# Patient Record
Sex: FEMALE | Race: WHITE | NOT HISPANIC OR LATINO | URBAN - NONMETROPOLITAN AREA
[De-identification: names, ages, dates, MRNs, and addresses within clinical notes are randomized per-mention and may not be internally consistent; named-entity substitution may affect disease eponyms.]

---

## 2017-02-03 PROBLEM — Z01.419 WELL WOMAN EXAM WITH ROUTINE GYNECOLOGICAL EXAM: Chronic | Status: ACTIVE | Noted: 2017-02-03

## 2024-08-09 ENCOUNTER — TELEPHONE (OUTPATIENT)
Dept: ONCOLOGY | Facility: CLINIC | Age: 44
End: 2024-08-09
Payer: COMMERCIAL

## 2024-08-09 NOTE — TELEPHONE ENCOUNTER
Caller: Ludy Lerma    Relationship: Self    Best call back number: [phone]     What is the best time to reach you: ASAP IF NEEDED    Who are you requesting to speak with (clinical staff, provider,  specific staff member):       What was the call regarding: PT MISSED CALL, NO MESSAGE, PLEASE CALL BACK IF NEEDED.

## 2024-08-21 ENCOUNTER — CONSULT (OUTPATIENT)
Dept: ONCOLOGY | Facility: CLINIC | Age: 44
End: 2024-08-21
Payer: COMMERCIAL

## 2024-08-21 ENCOUNTER — LAB (OUTPATIENT)
Dept: ONCOLOGY | Facility: CLINIC | Age: 44
End: 2024-08-21
Payer: COMMERCIAL

## 2024-08-21 VITALS
DIASTOLIC BLOOD PRESSURE: 76 MMHG | HEIGHT: 66 IN | WEIGHT: 198.8 LBS | TEMPERATURE: 97.1 F | HEART RATE: 74 BPM | BODY MASS INDEX: 31.95 KG/M2 | RESPIRATION RATE: 18 BRPM | SYSTOLIC BLOOD PRESSURE: 113 MMHG | OXYGEN SATURATION: 98 %

## 2024-08-21 DIAGNOSIS — D72.819 LEUKOPENIA, UNSPECIFIED TYPE: ICD-10-CM

## 2024-08-21 DIAGNOSIS — D72.819 LEUKOPENIA, UNSPECIFIED TYPE: Primary | ICD-10-CM

## 2024-08-21 LAB
ALBUMIN SERPL-MCNC: 4.1 G/DL (ref 3.5–5.2)
ALBUMIN/GLOB SERPL: 1.3 G/DL
ALP SERPL-CCNC: 65 U/L (ref 39–117)
ALT SERPL W P-5'-P-CCNC: 18 U/L (ref 1–33)
ANION GAP SERPL CALCULATED.3IONS-SCNC: 11.4 MMOL/L (ref 5–15)
AST SERPL-CCNC: 19 U/L (ref 1–32)
BASOPHILS # BLD AUTO: 0.05 10*3/MM3 (ref 0–0.2)
BASOPHILS NFR BLD AUTO: 1.5 % (ref 0–1.5)
BILIRUB SERPL-MCNC: 0.7 MG/DL (ref 0–1.2)
BUN SERPL-MCNC: 12 MG/DL (ref 6–20)
BUN/CREAT SERPL: 14.1 (ref 7–25)
CALCIUM SPEC-SCNC: 9.2 MG/DL (ref 8.6–10.5)
CHLORIDE SERPL-SCNC: 104 MMOL/L (ref 98–107)
CO2 SERPL-SCNC: 23.6 MMOL/L (ref 22–29)
CREAT SERPL-MCNC: 0.85 MG/DL (ref 0.57–1)
CRP SERPL-MCNC: <0.3 MG/DL (ref 0–0.5)
DEPRECATED RDW RBC AUTO: 43.5 FL (ref 37–54)
EGFRCR SERPLBLD CKD-EPI 2021: 87.3 ML/MIN/1.73
EOSINOPHIL # BLD AUTO: 0.06 10*3/MM3 (ref 0–0.4)
EOSINOPHIL NFR BLD AUTO: 1.8 % (ref 0.3–6.2)
ERYTHROCYTE [DISTWIDTH] IN BLOOD BY AUTOMATED COUNT: 11.9 % (ref 12.3–15.4)
ERYTHROCYTE [SEDIMENTATION RATE] IN BLOOD: 2 MM/HR (ref 0–20)
GLOBULIN UR ELPH-MCNC: 3.1 GM/DL
GLUCOSE SERPL-MCNC: 70 MG/DL (ref 65–99)
HAV IGM SERPL QL IA: NORMAL
HBV CORE IGM SERPL QL IA: NORMAL
HBV SURFACE AG SERPL QL IA: NORMAL
HCT VFR BLD AUTO: 39.5 % (ref 34–46.6)
HCV AB SER QL: NORMAL
HGB BLD-MCNC: 13.9 G/DL (ref 12–15.9)
HIV 1+2 AB+HIV1 P24 AG SERPL QL IA: NORMAL
IMM GRANULOCYTES # BLD AUTO: 0.02 10*3/MM3 (ref 0–0.05)
IMM GRANULOCYTES NFR BLD AUTO: 0.6 % (ref 0–0.5)
LYMPHOCYTES # BLD AUTO: 1.04 10*3/MM3 (ref 0.7–3.1)
LYMPHOCYTES NFR BLD AUTO: 31.6 % (ref 19.6–45.3)
MCH RBC QN AUTO: 34.7 PG (ref 26.6–33)
MCHC RBC AUTO-ENTMCNC: 35.2 G/DL (ref 31.5–35.7)
MCV RBC AUTO: 98.5 FL (ref 79–97)
MONOCYTES # BLD AUTO: 0.24 10*3/MM3 (ref 0.1–0.9)
MONOCYTES NFR BLD AUTO: 7.3 % (ref 5–12)
NEUTROPHILS NFR BLD AUTO: 1.88 10*3/MM3 (ref 1.7–7)
NEUTROPHILS NFR BLD AUTO: 57.2 % (ref 42.7–76)
NRBC BLD AUTO-RTO: 0 /100 WBC (ref 0–0.2)
PLATELET # BLD AUTO: 202 10*3/MM3 (ref 140–450)
PMV BLD AUTO: 9.8 FL (ref 6–12)
POTASSIUM SERPL-SCNC: 4.2 MMOL/L (ref 3.5–5.2)
PROT SERPL-MCNC: 7.2 G/DL (ref 6–8.5)
RBC # BLD AUTO: 4.01 10*6/MM3 (ref 3.77–5.28)
SODIUM SERPL-SCNC: 139 MMOL/L (ref 136–145)
WBC NRBC COR # BLD AUTO: 3.29 10*3/MM3 (ref 3.4–10.8)

## 2024-08-21 PROCEDURE — 82746 ASSAY OF FOLIC ACID SERUM: CPT | Performed by: NURSE PRACTITIONER

## 2024-08-21 PROCEDURE — 1159F MED LIST DOCD IN RCRD: CPT | Performed by: NURSE PRACTITIONER

## 2024-08-21 PROCEDURE — 80053 COMPREHEN METABOLIC PANEL: CPT | Performed by: NURSE PRACTITIONER

## 2024-08-21 PROCEDURE — 86225 DNA ANTIBODY NATIVE: CPT | Performed by: NURSE PRACTITIONER

## 2024-08-21 PROCEDURE — 99244 OFF/OP CNSLTJ NEW/EST MOD 40: CPT | Performed by: NURSE PRACTITIONER

## 2024-08-21 PROCEDURE — 85025 COMPLETE CBC W/AUTO DIFF WBC: CPT | Performed by: NURSE PRACTITIONER

## 2024-08-21 PROCEDURE — 86140 C-REACTIVE PROTEIN: CPT | Performed by: NURSE PRACTITIONER

## 2024-08-21 PROCEDURE — 86038 ANTINUCLEAR ANTIBODIES: CPT | Performed by: NURSE PRACTITIONER

## 2024-08-21 PROCEDURE — 1160F RVW MEDS BY RX/DR IN RCRD: CPT | Performed by: NURSE PRACTITIONER

## 2024-08-21 PROCEDURE — G0432 EIA HIV-1/HIV-2 SCREEN: HCPCS | Performed by: NURSE PRACTITIONER

## 2024-08-21 PROCEDURE — 82607 VITAMIN B-12: CPT | Performed by: NURSE PRACTITIONER

## 2024-08-21 PROCEDURE — 85652 RBC SED RATE AUTOMATED: CPT | Performed by: NURSE PRACTITIONER

## 2024-08-21 PROCEDURE — 86431 RHEUMATOID FACTOR QUANT: CPT | Performed by: NURSE PRACTITIONER

## 2024-08-21 PROCEDURE — 1125F AMNT PAIN NOTED PAIN PRSNT: CPT | Performed by: NURSE PRACTITIONER

## 2024-08-21 PROCEDURE — 80074 ACUTE HEPATITIS PANEL: CPT | Performed by: NURSE PRACTITIONER

## 2024-08-21 RX ORDER — PAROXETINE HYDROCHLORIDE 20 MG/1
20 TABLET, FILM COATED ORAL EVERY MORNING
COMMUNITY
Start: 2024-05-28

## 2024-08-21 RX ORDER — ALPRAZOLAM 1 MG/1
1 TABLET ORAL
COMMUNITY
Start: 2024-07-24

## 2024-08-21 RX ORDER — GABAPENTIN 600 MG/1
600 TABLET ORAL DAILY
COMMUNITY
Start: 2024-07-24

## 2024-08-21 RX ORDER — ASPIRIN 81 MG/1
TABLET, COATED ORAL
COMMUNITY
Start: 2024-07-24

## 2024-08-21 RX ORDER — SUMATRIPTAN 100 MG/1
100 TABLET, FILM COATED ORAL ONCE AS NEEDED
COMMUNITY
Start: 2024-07-24

## 2024-08-21 RX ORDER — QUETIAPINE FUMARATE 100 MG/1
100 TABLET, FILM COATED ORAL NIGHTLY
COMMUNITY

## 2024-08-21 RX ORDER — OMEPRAZOLE 40 MG/1
40 CAPSULE, DELAYED RELEASE ORAL DAILY
COMMUNITY

## 2024-08-21 RX ORDER — TIZANIDINE 4 MG/1
6 TABLET ORAL EVERY 8 HOURS PRN
COMMUNITY
Start: 2024-04-26

## 2024-08-21 RX ORDER — TRETINOIN 0.5 MG/G
CREAM TOPICAL
COMMUNITY
Start: 2024-07-24

## 2024-08-21 RX ORDER — CETIRIZINE HYDROCHLORIDE 10 MG/1
TABLET ORAL
COMMUNITY
Start: 2024-07-24

## 2024-08-21 RX ORDER — MECLIZINE HYDROCHLORIDE 25 MG/1
TABLET ORAL
COMMUNITY
Start: 2024-07-24

## 2024-08-21 NOTE — PROGRESS NOTES
DATE OF CONSULTATION:  8/21/2024    REASON FOR REFERRAL: Leukopenia    REFERRING PHYSICIAN:  Dorie Patton, *    CHIEF COMPLAINT:  Fatigue; Drenching Night Sweats        HISTORY OF PRESENT ILLNESS:   Ludy Lerma is a very pleasant 44 y.o. female who is being seen today at the request of Dorie Patton, * for evaluation and treatment of leukopenia. Ms. Lerma reports following with her PCP monthly with lab testing as needed. She was only recently made aware of this issue. Previous available CBCs were reviewed and patient was noted to have WBC 3.4 on 04/19/2024 and WBC 3.0 on 07/23/2024, macrocytosis with normal Hg/Hct and platelets. There is a CBC available from November 2015 that showed a low WBC 3.9. Unfortunately, there are no other CBCs available to review for comparison of trend. Since around April of this year she reports that she has had increasing fatigue and drenching night sweats requiring her to change her clothes at least twice weekly. She reports a fair appetite with weight loss of about 40 pounds. She does report that weight loss has been intentional. Denies fever/chills. Denies any recent infections. No recurrent infections. No tender/enlarged lymph nodes. Of note, she has previously been on Seroquel for insomnia but reports that she has been off of this medication for about one year. She is without any other complaints at this time.     PAST MEDICAL HISTORY:  Past Medical History:   Diagnosis Date    Acid reflux     Anxiety     Arthritis     Bilateral ovarian cysts     Depression     H/O blood clots     Migraines     PTSD (post-traumatic stress disorder)     Stroke 2015       PAST SURGICAL HISTORY:  Past Surgical History:   Procedure Laterality Date    COLONOSCOPY      HEMORRHOIDECTOMY      HYSTERECTOMY  2015    Partial    LAPAROSCOPIC CHOLECYSTECTOMY  06/2009       FAMILY HISTORY:  Family History   Problem Relation Age of Onset    Hypertension Mother     Myasthenia gravis Father      "Meniere's disease Father     Gout Father        SOCIAL HISTORY:  Social History     Socioeconomic History    Marital status:    Tobacco Use    Smoking status: Former     Current packs/day: 0.00     Types: Cigarettes     Quit date:      Years since quittin.6     Passive exposure: Past    Smokeless tobacco: Never   Vaping Use    Vaping status: Never Used   Substance and Sexual Activity    Alcohol use: Never    Drug use: Never    Sexual activity: Defer     Comment:               MEDICATIONS:  The current medication list was reviewed in the EMR    Current Outpatient Medications:     ALPRAZolam (XANAX) 1 MG tablet, Take 1 tablet by mouth., Disp: , Rfl:     Aspirin Low Dose 81 MG EC tablet, , Disp: , Rfl:     cetirizine (zyrTEC) 10 MG tablet, , Disp: , Rfl:     gabapentin (NEURONTIN) 600 MG tablet, 1 tablet Daily., Disp: , Rfl:     meclizine (ANTIVERT) 25 MG tablet, , Disp: , Rfl:     metoprolol tartrate (LOPRESSOR) 25 MG tablet, Take 1 tablet by mouth 2 (Two) Times a Day., Disp: , Rfl:     omeprazole (priLOSEC) 40 MG capsule, Take 1 capsule by mouth Daily., Disp: , Rfl:     PARoxetine (PAXIL) 20 MG tablet, Take 1 tablet by mouth Every Morning., Disp: , Rfl:     QUEtiapine (SEROquel) 100 MG tablet, Take 1 tablet by mouth Every Night., Disp: , Rfl:     Retin-A 0.05 % cream, , Disp: , Rfl:     SUMAtriptan (IMITREX) 100 MG tablet, 1 tablet 1 (One) Time As Needed., Disp: , Rfl:     tiZANidine (ZANAFLEX) 4 MG tablet, Take 1.5 tablets by mouth Every 8 (Eight) Hours As Needed for Muscle Spasms., Disp: , Rfl:     ALLERGIES:    Allergies   Allergen Reactions    Codeine Dizziness and Headache         REVIEW OF SYSTEMS:    A comprehensive 14 point review of systems was performed.  Significant findings as mentioned above.  All other systems reviewed and are negative.        Physical Exam   Vital Signs: /76   Pulse 74   Temp 97.1 °F (36.2 °C) (Temporal)   Resp 18   Ht 167.6 cm (66\")   Wt 90.2 kg (198 " lb 12.8 oz)   SpO2 98%   BMI 32.09 kg/m²     ECOG score: 0   General: Well developed, well nourished, alert and oriented x 3, in no acute distress.   Head: ATNC   Eyes: PERRL, No evidence of conjunctivitis.   Nose: No nasal discharge.   Mouth: Oral mucosal membranes moist. No oral ulceration or hemorrhages.   Neck: Neck supple. No thyromegaly. No JVD.   Lungs: Clear in all fields to A&P without rales, rhonchi or wheezing.   Heart: S1, S2. Regular rate and rhythm. No murmurs, rubs, or gallops.   Abdomen: Soft. Bowel sounds are normoactive. Nontender with palpation. No Hepatosplenomegaly can be appreciated.   Extremities: No cyanosis or edema. Peripheral pulses palpable and equal bilaterally.   Integumentary: No rash, sores, erythema or nodules. No blistering, bruising, or dry skin.   Hem/Lymph Nodes: No palpable cervical, submandibular, supraclavicular, axillary  lymphadenopathy noted. No petechiae, purpura or ecchymosis noted.   Neurologic: Grossly non-focal exam    Pain Score:  Pain Score    08/21/24 1302   PainSc:   1       PHQ-Score Total:  PHQ-9 Total Score: 0       PATHOLOGY:        ENDOSCOPY:        IMAGING:        RECENT LABS:  Lab Results   Component Value Date    WBC 4.6 11/21/2015    HGB 13.6 11/21/2015    HCT 38.6 11/21/2015    MCV 97.7 (H) 11/21/2015    RDW 11.5 11/21/2015     11/21/2015    NEUTRORELPCT 67.2 11/21/2015    LYMPHORELPCT 25.0 11/21/2015    MONORELPCT 5.2 11/21/2015    EOSRELPCT 2.2 11/21/2015    BASORELPCT 0.4 11/21/2015    NEUTROABS 3.1 11/21/2015    LYMPHSABS 1.2 11/21/2015       Lab Results   Component Value Date     11/21/2015    K 3.6 11/21/2015    CO2 26.2 11/21/2015     11/21/2015    BUN 10 11/21/2015    CREATININE 0.92 11/21/2015    GLUCOSE 103 (H) 11/21/2015    CALCIUM 9.0 11/21/2015    ALKPHOS 74 11/21/2015    AST 73 (H) 11/21/2015    ALT 96 (H) 11/21/2015    BILITOT 0.6 11/21/2015    ALBUMIN 3.9 11/21/2015    PROTEINTOT 7.1 11/21/2015 11/19/2015  WBC  4.5  - 12.5 K/Cumm 3.9 Low    RBC  4.20 - 5.40 Million 3.78 Low    Hemoglobin  12.0 - 16.0 g/dL 12.8   Hematocrit  37.0 - 47.0 % 37.2   MCV  80.0 - 94.0 fL 98.4 High    MCH  27.0 - 33.0 pg 33.9 High    MCHC  33.0 - 37.0 g/dL 34.4   Platelets  130 - 400 K/Cumm 184   RDW  11.5 - 14.5 % 11.6   MPV  6.0 - 10.0 fL 9.8   Neutrophil Rel %  30.0 - 70.0 % 44.4   Lymphocyte Rel %  21.0 - 51.0 % 40.6   Monocyte Rel %  0.0 - 10.0 % 11.6 High    Eosinophil Rel %  0.0 - 5.0 % 2.6   Basophil Rel %  0.0 - 2.0 % 0.8   Immature Granulocyte Rel %  0.00 - 0.43 % 0.00   Neutrophils Absolute  1.4 - 6.5 K/Cumm 1.7   Lymphocytes Absolute  1.0 - 3.0 K/Cumm 1.6   Monocytes Absolute  0.1 - 0.9 K/Cumm 0.5   Eosinophils Absolute  0.0 - 0.7 K/Cumm 0.1   Basophils Absolute  0.0 - 0.3 K/Cumm 0.0   Abs Imm Gran  0.000 - 0.031 K/Cumm 0.000               ASSESSMENT & PLAN:  Ludy Lerma is a very pleasant 44 y.o. female with    1. Leukopenia  2. Macrocytosis without anemia  - Previous available CBCs were reviewed and patient was noted to have WBC 3.4 on 04/19/2024 and WBC 3.0 on 07/23/2024, macrocytosis with normal Hg/Hct and platelets. There is a CBC available from November 2015 that showed a low WBC 3.9. Unfortunately, there are no other CBCs available to review for comparison of trend.   - She reports worsening fatigue and drenching night sweats requiring a change of clothes twice weekly since April 2024.  - Obtained additional labs today to further evaluate including CBC, CMP, PBS, B12, Folate, CRP, ESR, Acute Hepatitis panel, HIV panel, NORMA and RF. Will also obtain Flow Cytometry and BCR/ABL to evaluate for underlying myeloproliferative disorder.  - Will also obtain abdominal ultrasound prior to follow up.  - Will follow up in 6 weeks with repeat CBC and to discuss above pending work up and further management.      ACO / MANJINDER/Other  Quality measures  -  Ludy Lerma did not receive 2023 flu vaccine.  This was recommended.  -  Ludy Maria Guadalupe reports a  pain score of 1.  Given her pain assessment as noted, treatment options were discussed and the following options were decided upon as a follow-up plan to address the patient's pain: continuation of current treatment plan for pain and referral to Primary Care for assistance in pain treatment guidance.  -  Current outpatient and discharge medications have been reconciled for the patient.  Reviewed by: CARLOS Nevarez              The patient was in agreement with the plan and all questions were answered to her satisfaction.     Thank you so much for allowing us to participate in the care of Ludy Lerma . Please do not hesitate to contact us with any questions or concerns.     A total of 45 minutes were spent coordinating this patient’s care in clinic today; more than 50% of this time was face-to-face with the patient, reviewing her interim medical history and counseling on the current treatment and followup plan. All questions were answered to her satisfaction.      Electronically Signed by: CARLOS Valencia , August 21, 2024 13:30 EDT           Electronically Signed by: CARLOS Valencia , August 21, 2024 13:30 EDT       CC:   Dorie Patton, *  Abdi, CARLOS Lorenzo

## 2024-08-21 NOTE — PROGRESS NOTES
Venipuncture Blood Specimen Collection  Venipuncture performed in right arm by Pavel Mohan MA with good hemostasis. Patient tolerated the procedure well without complications.   08/21/24   Pavel Mohan MA

## 2024-08-22 LAB
ANA SER QL: POSITIVE
CHROMATIN AB SERPL-ACNC: <10 IU/ML (ref 0–14)
DSDNA AB SER-ACNC: <1 IU/ML (ref 0–9)
FOLATE SERPL-MCNC: 6.62 NG/ML (ref 4.78–24.2)
Lab: NORMAL
REF LAB TEST METHOD: NORMAL
VIT B12 BLD-MCNC: 513 PG/ML (ref 211–946)

## 2024-08-23 LAB — REF LAB TEST METHOD: NORMAL

## 2024-08-26 DIAGNOSIS — R76.8 POSITIVE ANA (ANTINUCLEAR ANTIBODY): Primary | ICD-10-CM

## 2024-08-26 NOTE — PROGRESS NOTES
Notified patient of positive NORMA. Recommended to repeat NORMA in 3 months to confirm positivity or referral to rheumatology. She is requesting referral. Referral to rheumatology was placed today. Will follow up as planned.    Nellie Vann, CARLOS  08/26/2024  1050 am

## 2024-09-02 LAB
INTERPRETATION: NEGATIVE
LAB DIRECTOR NAME PROVIDER: NORMAL
LABORATORY COMMENT REPORT: NORMAL
REF LAB TEST METHOD: NORMAL
T(ABL1,BCR)B2A2/CONTROL BLD/T: NORMAL %
T(ABL1,BCR)B3A2/CONTROL BLD/T: NORMAL %
T(ABL1,BCR)E1A2/CONTROL BLD/T: NORMAL %

## 2024-10-01 ENCOUNTER — HOSPITAL ENCOUNTER (OUTPATIENT)
Facility: HOSPITAL | Age: 44
Discharge: HOME | End: 2024-10-01
Admitting: NURSE PRACTITIONER
Payer: COMMERCIAL

## 2024-10-01 DIAGNOSIS — D72.819 LEUKOPENIA, UNSPECIFIED TYPE: ICD-10-CM

## 2024-10-01 PROCEDURE — 76700 US EXAM ABDOM COMPLETE: CPT

## 2024-10-02 ENCOUNTER — LAB (OUTPATIENT)
Dept: ONCOLOGY | Facility: CLINIC | Age: 44
End: 2024-10-02
Payer: COMMERCIAL

## 2024-10-02 ENCOUNTER — OFFICE VISIT (OUTPATIENT)
Dept: ONCOLOGY | Facility: CLINIC | Age: 44
End: 2024-10-02
Payer: COMMERCIAL

## 2024-10-02 VITALS
SYSTOLIC BLOOD PRESSURE: 98 MMHG | WEIGHT: 199.2 LBS | RESPIRATION RATE: 20 BRPM | OXYGEN SATURATION: 100 % | DIASTOLIC BLOOD PRESSURE: 66 MMHG | HEART RATE: 81 BPM | BODY MASS INDEX: 32.02 KG/M2 | HEIGHT: 66 IN | TEMPERATURE: 98 F

## 2024-10-02 DIAGNOSIS — D72.819 LEUKOPENIA, UNSPECIFIED TYPE: ICD-10-CM

## 2024-10-02 DIAGNOSIS — R76.8 POSITIVE ANA (ANTINUCLEAR ANTIBODY): ICD-10-CM

## 2024-10-02 DIAGNOSIS — D72.819 LEUKOPENIA, UNSPECIFIED TYPE: Primary | ICD-10-CM

## 2024-10-02 LAB
BASOPHILS # BLD AUTO: 0.04 10*3/MM3 (ref 0–0.2)
BASOPHILS NFR BLD AUTO: 1.2 % (ref 0–1.5)
DEPRECATED RDW RBC AUTO: 42.9 FL (ref 37–54)
EOSINOPHIL # BLD AUTO: 0.11 10*3/MM3 (ref 0–0.4)
EOSINOPHIL NFR BLD AUTO: 3.4 % (ref 0.3–6.2)
ERYTHROCYTE [DISTWIDTH] IN BLOOD BY AUTOMATED COUNT: 12.1 % (ref 12.3–15.4)
HCT VFR BLD AUTO: 35.7 % (ref 34–46.6)
HGB BLD-MCNC: 12.7 G/DL (ref 12–15.9)
IMM GRANULOCYTES # BLD AUTO: 0.01 10*3/MM3 (ref 0–0.05)
IMM GRANULOCYTES NFR BLD AUTO: 0.3 % (ref 0–0.5)
LYMPHOCYTES # BLD AUTO: 1.44 10*3/MM3 (ref 0.7–3.1)
LYMPHOCYTES NFR BLD AUTO: 44.6 % (ref 19.6–45.3)
MCH RBC QN AUTO: 34.9 PG (ref 26.6–33)
MCHC RBC AUTO-ENTMCNC: 35.6 G/DL (ref 31.5–35.7)
MCV RBC AUTO: 98.1 FL (ref 79–97)
MONOCYTES # BLD AUTO: 0.28 10*3/MM3 (ref 0.1–0.9)
MONOCYTES NFR BLD AUTO: 8.7 % (ref 5–12)
NEUTROPHILS NFR BLD AUTO: 1.35 10*3/MM3 (ref 1.7–7)
NEUTROPHILS NFR BLD AUTO: 41.8 % (ref 42.7–76)
NRBC BLD AUTO-RTO: 0 /100 WBC (ref 0–0.2)
PLATELET # BLD AUTO: 143 10*3/MM3 (ref 140–450)
PMV BLD AUTO: 9.2 FL (ref 6–12)
RBC # BLD AUTO: 3.64 10*6/MM3 (ref 3.77–5.28)
WBC NRBC COR # BLD AUTO: 3.23 10*3/MM3 (ref 3.4–10.8)

## 2024-10-02 PROCEDURE — 99214 OFFICE O/P EST MOD 30 MIN: CPT | Performed by: NURSE PRACTITIONER

## 2024-10-02 PROCEDURE — 1125F AMNT PAIN NOTED PAIN PRSNT: CPT | Performed by: NURSE PRACTITIONER

## 2024-10-02 PROCEDURE — 85025 COMPLETE CBC W/AUTO DIFF WBC: CPT | Performed by: NURSE PRACTITIONER

## 2024-10-02 PROCEDURE — 1160F RVW MEDS BY RX/DR IN RCRD: CPT | Performed by: NURSE PRACTITIONER

## 2024-10-02 PROCEDURE — 1159F MED LIST DOCD IN RCRD: CPT | Performed by: NURSE PRACTITIONER

## 2024-10-02 RX ORDER — PROMETHAZINE HYDROCHLORIDE 25 MG/1
25 TABLET ORAL EVERY 8 HOURS PRN
COMMUNITY
Start: 2024-09-10

## 2024-10-02 RX ORDER — ONDANSETRON 4 MG/1
4 TABLET, ORALLY DISINTEGRATING ORAL EVERY 8 HOURS PRN
COMMUNITY
Start: 2024-09-10

## 2024-10-02 RX ORDER — FOLIC ACID 1 MG/1
1 TABLET ORAL DAILY
Qty: 30 TABLET | Refills: 5 | Status: SHIPPED | OUTPATIENT
Start: 2024-10-02

## 2024-10-02 RX ORDER — TAMSULOSIN HYDROCHLORIDE 0.4 MG/1
1 CAPSULE ORAL DAILY
COMMUNITY
Start: 2013-08-01

## 2024-10-02 RX ORDER — KETOCONAZOLE 20 MG/ML
1 SHAMPOO TOPICAL WEEKLY
COMMUNITY
Start: 2024-09-20

## 2024-10-02 NOTE — PROGRESS NOTES
DATE OF FOLLOW UP:  10/2/2024    REASON FOR REFERRAL: Leukopenia    REFERRING PHYSICIAN:  No ref. provider found    CHIEF COMPLAINT:  Fatigue        HISTORY OF PRESENT ILLNESS:   Ludy Lerma is a very pleasant 44 y.o. female who is being seen today at the request of No ref. provider found for evaluation and treatment of leukopenia. Ms. Lerma reports following with her PCP monthly with lab testing as needed. She was only recently made aware of this issue. Previous available CBCs were reviewed and patient was noted to have WBC 3.4 on 04/19/2024 and WBC 3.0 on 07/23/2024, macrocytosis with normal Hg/Hct and platelets. There is a CBC available from November 2015 that showed a low WBC 3.9. Unfortunately, there are no other CBCs available to review for comparison of trend. Since around April of this year she reports that she has had increasing fatigue and drenching night sweats requiring her to change her clothes at least twice weekly. She reports a fair appetite with weight loss of about 40 pounds. She does report that weight loss has been intentional. Denies fever/chills. Denies any recent infections. No recurrent infections. No tender/enlarged lymph nodes. Of note, she has previously been on Seroquel for insomnia but reports that she has been off of this medication for about one year. She is without any other complaints at this time.     INTERVAL HISTORY:  Ms. Lerma presents today for follow up of leukopenia. She reports today that she continues to struggle with fatigue. She is no longer having drenching night sweats. She reports a good appetite, stable weight. Denies fever/chills or any tender/enlarged lymph nodes. She has appointment arranged with rheumatology in [place] for January 2025. She is without any other complaints at this time.     PAST MEDICAL HISTORY:  Past Medical History:   Diagnosis Date    Acid reflux     Anxiety     Arthritis     Bilateral ovarian cysts     Depression     H/O blood clots      Migraines     PTSD (post-traumatic stress disorder)     Stroke 2015       PAST SURGICAL HISTORY:  Past Surgical History:   Procedure Laterality Date    COLONOSCOPY      HEMORRHOIDECTOMY      HYSTERECTOMY  2015    Partial    LAPAROSCOPIC CHOLECYSTECTOMY  2009       FAMILY HISTORY:  Family History   Problem Relation Age of Onset    Hypertension Mother     Myasthenia gravis Father     Meniere's disease Father     Gout Father        SOCIAL HISTORY:  Social History     Socioeconomic History    Marital status:    Tobacco Use    Smoking status: Former     Current packs/day: 0.00     Types: Cigarettes     Quit date:      Years since quittin.7     Passive exposure: Past    Smokeless tobacco: Never   Vaping Use    Vaping status: Never Used   Substance and Sexual Activity    Alcohol use: Never    Drug use: Never    Sexual activity: Defer     Comment:               MEDICATIONS:  The current medication list was reviewed in the EMR    Current Outpatient Medications:     ALPRAZolam (XANAX) 1 MG tablet, Take 1 tablet by mouth., Disp: , Rfl:     Aspirin Low Dose 81 MG EC tablet, , Disp: , Rfl:     cetirizine (zyrTEC) 10 MG tablet, , Disp: , Rfl:     gabapentin (NEURONTIN) 600 MG tablet, 1 tablet Daily., Disp: , Rfl:     ketoconazole (NIZORAL) 2 % shampoo, Apply 1 Application topically to the appropriate area as directed 1 (One) Time Per Week., Disp: , Rfl:     meclizine (ANTIVERT) 25 MG tablet, , Disp: , Rfl:     metoprolol tartrate (LOPRESSOR) 25 MG tablet, Take 1 tablet by mouth 2 (Two) Times a Day., Disp: , Rfl:     omeprazole (priLOSEC) 40 MG capsule, Take 1 capsule by mouth Daily., Disp: , Rfl:     ondansetron ODT (ZOFRAN-ODT) 4 MG disintegrating tablet, Place 1 tablet on the tongue Every 8 (Eight) Hours As Needed., Disp: , Rfl:     PARoxetine (PAXIL) 20 MG tablet, Take 1 tablet by mouth Every Morning., Disp: , Rfl:     promethazine (PHENERGAN) 25 MG tablet, Take 1 tablet by mouth Every 8 (Eight) Hours  "As Needed., Disp: , Rfl:     Retin-A 0.05 % cream, , Disp: , Rfl:     SUMAtriptan (IMITREX) 100 MG tablet, 1 tablet 1 (One) Time As Needed., Disp: , Rfl:     tamsulosin (Flomax) 0.4 MG capsule 24 hr capsule, Take 1 capsule by mouth Daily., Disp: , Rfl:     tiZANidine (ZANAFLEX) 4 MG tablet, Take 1.5 tablets by mouth Every 8 (Eight) Hours As Needed for Muscle Spasms., Disp: , Rfl:     QUEtiapine (SEROquel) 100 MG tablet, Take 1 tablet by mouth Every Night. (Patient not taking: Reported on 10/2/2024), Disp: , Rfl:     ALLERGIES:    Allergies   Allergen Reactions    Codeine Dizziness and Headache         REVIEW OF SYSTEMS:    A comprehensive 14 point review of systems was performed.  Significant findings as mentioned above.  All other systems reviewed and are negative.        Physical Exam   Vital Signs: BP 98/66   Pulse 81   Temp 98 °F (36.7 °C) (Temporal)   Resp 20   Ht 167.6 cm (66\")   Wt 90.4 kg (199 lb 3.2 oz)   SpO2 100%   BMI 32.15 kg/m²     ECOG score: 0   General: Well developed, well nourished, alert and oriented x 3, in no acute distress.   Head: ATNC   Eyes: PERRL, No evidence of conjunctivitis.   Nose: No nasal discharge.   Mouth: Oral mucosal membranes moist. No oral ulceration or hemorrhages.   Neck: Neck supple. No thyromegaly. No JVD.   Lungs: Clear in all fields to A&P without rales, rhonchi or wheezing.   Heart: S1, S2. Regular rate and rhythm. No murmurs, rubs, or gallops.   Abdomen: Soft. Bowel sounds are normoactive. Nontender with palpation. No Hepatosplenomegaly can be appreciated.   Extremities: No cyanosis or edema. Peripheral pulses palpable and equal bilaterally.   Integumentary: No rash, sores, erythema or nodules. No blistering, bruising, or dry skin.   Hem/Lymph Nodes: No palpable cervical, submandibular, supraclavicular, axillary  lymphadenopathy noted. No petechiae, purpura or ecchymosis noted.   Neurologic: Grossly non-focal exam    Pain Score:  Pain Score    10/02/24 0952 "   PainSc:   5   PainLoc: Groin       PHQ-Score Total:  PHQ-9 Total Score:         PATHOLOGY:        ENDOSCOPY:        IMAGING:  US Abdomen Complete (10/01/2024 09:04)   FINDINGS:  Liver:  Unremarkable as visualized.  No mass.  No intrahepatic bile  duct dilation.  Gallbladder:  Cholecystectomy.  Common bile duct:  Unremarkable as visualized.  No stones.  No  dilation.  Common bile duct measures 0.43 cm in diameter.  Pancreas:  Unremarkable as visualized.  Kidneys:  Unremarkable as visualized.  No stones.  No hydronephrosis.   The right kidney measures 9.9 cm in length.  The left kidney measures  11.8 cm in length.  Spleen:  Unremarkable as visualized.  The spleen measures 11 cm in  maximum dimension.  Aorta:  Unremarkable as visualized.  No aneurysm.  Inferior vena cava:  Unremarkable as visualized.     IMPRESSION:  No acute findings in the abdomen.    RECENT LABS:  Lab Results   Component Value Date    WBC 3.23 (L) 10/02/2024    HGB 12.7 10/02/2024    HCT 35.7 10/02/2024    MCV 98.1 (H) 10/02/2024    RDW 12.1 (L) 10/02/2024     10/02/2024    NEUTRORELPCT 41.8 (L) 10/02/2024    LYMPHORELPCT 44.6 10/02/2024    MONORELPCT 8.7 10/02/2024    EOSRELPCT 3.4 10/02/2024    BASORELPCT 1.2 10/02/2024    NEUTROABS 1.35 (L) 10/02/2024    LYMPHSABS 1.44 10/02/2024       Lab Results   Component Value Date     08/21/2024    K 4.2 08/21/2024    CO2 23.6 08/21/2024     08/21/2024    BUN 12 08/21/2024    CREATININE 0.85 08/21/2024    GLUCOSE 70 08/21/2024    CALCIUM 9.2 08/21/2024    ALKPHOS 65 08/21/2024    AST 19 08/21/2024    ALT 18 08/21/2024    BILITOT 0.7 08/21/2024    ALBUMIN 4.1 08/21/2024    PROTEINTOT 7.2 08/21/2024 11/19/2015  WBC  4.5 - 12.5 K/Cumm 3.9 Low    RBC  4.20 - 5.40 Million 3.78 Low    Hemoglobin  12.0 - 16.0 g/dL 12.8   Hematocrit  37.0 - 47.0 % 37.2   MCV  80.0 - 94.0 fL 98.4 High    MCH  27.0 - 33.0 pg 33.9 High    MCHC  33.0 - 37.0 g/dL 34.4   Platelets  130 - 400 K/Cumm 184    RDW  11.5 - 14.5 % 11.6   MPV  6.0 - 10.0 fL 9.8   Neutrophil Rel %  30.0 - 70.0 % 44.4   Lymphocyte Rel %  21.0 - 51.0 % 40.6   Monocyte Rel %  0.0 - 10.0 % 11.6 High    Eosinophil Rel %  0.0 - 5.0 % 2.6   Basophil Rel %  0.0 - 2.0 % 0.8   Immature Granulocyte Rel %  0.00 - 0.43 % 0.00   Neutrophils Absolute  1.4 - 6.5 K/Cumm 1.7   Lymphocytes Absolute  1.0 - 3.0 K/Cumm 1.6   Monocytes Absolute  0.1 - 0.9 K/Cumm 0.5   Eosinophils Absolute  0.0 - 0.7 K/Cumm 0.1   Basophils Absolute  0.0 - 0.3 K/Cumm 0.0   Abs Imm Gran  0.000 - 0.031 K/Cumm 0.000             Work Up 08/21/2024            Vitamin B-12  211 - 946 pg/mL 513     Folate  4.78 - 24.20 ng/mL 6.62     Lab Results   Component Value Date    CRP <0.30 08/21/2024     Lab Results   Component Value Date    SEDRATE 2 08/21/2024     Hepatitis B Surface Ag  Non-Reactive Non-Reactive   Hep A IgM  Non-Reactive Non-Reactive   Hep B C IgM  Non-Reactive Non-Reactive   Hepatitis C Ab  Non-Reactive Non-Reactive     HIV-1/ HIV-2  Non-Reactive Non-Reactive     NORMA Direct  Negative Positive Abnormal      Rheumatoid Factor Quantitative  0.0 - 14.0 IU/mL <10.0     BCR/ABL: Negative      ASSESSMENT & PLAN:  Ludy Lerma is a very pleasant 44 y.o. female with    1. Leukopenia  2. Macrocytosis without anemia  - Previous available CBCs were reviewed and patient was noted to have WBC 3.4 on 04/19/2024 and WBC 3.0 on 07/23/2024, macrocytosis with normal Hg/Hct and platelets. There is a CBC available from November 2015 that showed a low WBC 3.9. Unfortunately, there are no other CBCs available to review for comparison of trend.   - She continues to struggle with fatigue but is no longer having drenching night sweats.   - CBC from initial consultation showed low WBC 3.29 with normal ANC. Hg/Hct and platelets were normal. PBS showed red blood cell macrocytosis with otherwise normal granulocyte and lymphocyte morphology, no circulating blasts identified, adequate platelets. B12 was  replete. Folate was marginally low. CRP and ESR were normal. Acute Hepatitis panel and HIV panel non-reactive. NORMA was positive. RF was negative. Flow Cytometry showed granulocytes with non specific partial CD56 expression, no other immunophenotypic abnormalities. Will repeat Flow Cytometry in 3 months. BCR/ABL was negative.   - Abdominal ultrasound was unremarkable.   - Discussed with patient that direct bone marrow suppression from possible underlying autoimmune disorder as well as marginally low Folate could be contributing to leukopenia.  - Referral was placed for rheumatology and she has an appointment scheduled for January 2025.  - RX provided today for Folic Acid 1 mg daily.   - Will follow up in 4 months with repeat CBC and Flow Cytometry.  - Discussed more aggressive management with possible bone marrow biopsy and she would like to continue conservative management for now.       ACO / MANJINDER/Other  Quality measures  -  Ludy Lerma did not receive 2024 flu vaccine.  This was recommended.  -  Ludy Lerma reports a pain score of 5.  Given her pain assessment as noted, treatment options were discussed and the following options were decided upon as a follow-up plan to address the patient's pain: continuation of current treatment plan for pain and referral to Primary Care for assistance in pain treatment guidance.  -  Current outpatient and discharge medications have been reconciled for the patient.  Reviewed by: CARLOS Nevarez      I spent 30 minutes caring for Ludy on this date of service. This time includes time spent by me in the following activities: preparing for the visit, reviewing tests, performing a medically appropriate examination and/or evaluation, counseling and educating the patient/family/caregiver, referring and communicating with other health care professionals, documenting information in the medical record, independently interpreting results and communicating that information with  the patient/family/caregiver, care coordination, ordering medications, ordering test(s), obtaining a separately obtained history, and reviewing a separately obtained history.                                   Electronically Signed by: CARLOS Valencia , October 2, 2024 10:14 EDT           Electronically Signed by: CARLOS Valencia , October 2, 2024 10:14 EDT       CC:   No ref. provider found  Dorie Patton APRN

## 2024-10-02 NOTE — PROGRESS NOTES
Venipuncture Blood Specimen Collection  Venipuncture performed in right arm by Celia Awad MA with good hemostasis. Patient tolerated the procedure well without complications.   10/02/24   Celia Awad MA

## 2025-01-30 ENCOUNTER — TRANSCRIBE ORDERS (OUTPATIENT)
Dept: ADMINISTRATIVE | Facility: HOSPITAL | Age: 45
End: 2025-01-30
Payer: COMMERCIAL

## 2025-01-30 DIAGNOSIS — F17.213 NICOTINE DEPENDENCE, CIGARETTES, WITH WITHDRAWAL: Primary | ICD-10-CM

## 2025-02-04 ENCOUNTER — TELEPHONE (OUTPATIENT)
Dept: ONCOLOGY | Facility: CLINIC | Age: 45
End: 2025-02-04
Payer: COMMERCIAL

## 2025-02-04 NOTE — TELEPHONE ENCOUNTER
Caller: Ludy Lerma    Relationship to patient: Self    Best call back number: [phone]    Chief complaint: PATIENT CALLED TO RESCHEDULE     Type of visit: LAB AND FOLLOW UP 2      If rescheduling, when is the original appointment: 2-3-25

## 2025-02-27 ENCOUNTER — OFFICE VISIT (OUTPATIENT)
Dept: ONCOLOGY | Facility: CLINIC | Age: 45
End: 2025-02-27
Payer: COMMERCIAL

## 2025-02-27 ENCOUNTER — LAB (OUTPATIENT)
Dept: ONCOLOGY | Facility: CLINIC | Age: 45
End: 2025-02-27
Payer: COMMERCIAL

## 2025-02-27 VITALS
OXYGEN SATURATION: 98 % | BODY MASS INDEX: 34.65 KG/M2 | RESPIRATION RATE: 20 BRPM | DIASTOLIC BLOOD PRESSURE: 80 MMHG | TEMPERATURE: 97.7 F | HEART RATE: 96 BPM | HEIGHT: 66 IN | WEIGHT: 215.6 LBS | SYSTOLIC BLOOD PRESSURE: 112 MMHG

## 2025-02-27 DIAGNOSIS — D72.819 LEUKOPENIA, UNSPECIFIED TYPE: Primary | ICD-10-CM

## 2025-02-27 DIAGNOSIS — R76.8 POSITIVE ANA (ANTINUCLEAR ANTIBODY): ICD-10-CM

## 2025-02-27 DIAGNOSIS — D72.819 LEUKOPENIA, UNSPECIFIED TYPE: ICD-10-CM

## 2025-02-27 DIAGNOSIS — R06.02 SHORTNESS OF BREATH: ICD-10-CM

## 2025-02-27 LAB
BASOPHILS # BLD AUTO: 0.05 10*3/MM3 (ref 0–0.2)
BASOPHILS NFR BLD AUTO: 1.6 % (ref 0–1.5)
DEPRECATED RDW RBC AUTO: 42.6 FL (ref 37–54)
EOSINOPHIL # BLD AUTO: 0.1 10*3/MM3 (ref 0–0.4)
EOSINOPHIL NFR BLD AUTO: 3.3 % (ref 0.3–6.2)
ERYTHROCYTE [DISTWIDTH] IN BLOOD BY AUTOMATED COUNT: 11.9 % (ref 12.3–15.4)
HCT VFR BLD AUTO: 38.5 % (ref 34–46.6)
HGB BLD-MCNC: 13.3 G/DL (ref 12–15.9)
IMM GRANULOCYTES # BLD AUTO: 0 10*3/MM3 (ref 0–0.05)
IMM GRANULOCYTES NFR BLD AUTO: 0 % (ref 0–0.5)
LYMPHOCYTES # BLD AUTO: 1.14 10*3/MM3 (ref 0.7–3.1)
LYMPHOCYTES NFR BLD AUTO: 37.4 % (ref 19.6–45.3)
MCH RBC QN AUTO: 34.7 PG (ref 26.6–33)
MCHC RBC AUTO-ENTMCNC: 34.5 G/DL (ref 31.5–35.7)
MCV RBC AUTO: 100.5 FL (ref 79–97)
MONOCYTES # BLD AUTO: 0.29 10*3/MM3 (ref 0.1–0.9)
MONOCYTES NFR BLD AUTO: 9.5 % (ref 5–12)
NEUTROPHILS NFR BLD AUTO: 1.47 10*3/MM3 (ref 1.7–7)
NEUTROPHILS NFR BLD AUTO: 48.2 % (ref 42.7–76)
NRBC BLD AUTO-RTO: 0 /100 WBC (ref 0–0.2)
PLATELET # BLD AUTO: 203 10*3/MM3 (ref 140–450)
PMV BLD AUTO: 9.3 FL (ref 6–12)
RBC # BLD AUTO: 3.83 10*6/MM3 (ref 3.77–5.28)
WBC NRBC COR # BLD AUTO: 3.05 10*3/MM3 (ref 3.4–10.8)

## 2025-02-27 PROCEDURE — 1159F MED LIST DOCD IN RCRD: CPT | Performed by: NURSE PRACTITIONER

## 2025-02-27 PROCEDURE — 99214 OFFICE O/P EST MOD 30 MIN: CPT | Performed by: NURSE PRACTITIONER

## 2025-02-27 PROCEDURE — 1125F AMNT PAIN NOTED PAIN PRSNT: CPT | Performed by: NURSE PRACTITIONER

## 2025-02-27 PROCEDURE — 85025 COMPLETE CBC W/AUTO DIFF WBC: CPT | Performed by: NURSE PRACTITIONER

## 2025-02-27 PROCEDURE — 1160F RVW MEDS BY RX/DR IN RCRD: CPT | Performed by: NURSE PRACTITIONER

## 2025-02-27 NOTE — PROGRESS NOTES
DATE OF FOLLOW UP:  2/27/2025    REASON FOR REFERRAL: Leukopenia    REFERRING PHYSICIAN:  No ref. provider found    CHIEF COMPLAINT:  Fatigue, Drenching Night Sweats    TREATMENT:  Folic Acid 1 mg daily        HISTORY OF PRESENT ILLNESS:   Ludy Lerma is a very pleasant 44 y.o. female who is being seen today at the request of No ref. provider found for evaluation and treatment of leukopenia. Ms. Lerma reports following with her PCP monthly with lab testing as needed. She was only recently made aware of this issue. Previous available CBCs were reviewed and patient was noted to have WBC 3.4 on 04/19/2024 and WBC 3.0 on 07/23/2024, macrocytosis with normal Hg/Hct and platelets. There is a CBC available from November 2015 that showed a low WBC 3.9. Unfortunately, there are no other CBCs available to review for comparison of trend. Since around April of this year she reports that she has had increasing fatigue and drenching night sweats requiring her to change her clothes at least twice weekly. She reports a fair appetite with weight loss of about 40 pounds. She does report that weight loss has been intentional. Denies fever/chills. Denies any recent infections. No recurrent infections. No tender/enlarged lymph nodes. Of note, she has previously been on Seroquel for insomnia but reports that she has been off of this medication for about one year. She is without any other complaints at this time.     INTERVAL HISTORY:  Ms. Lerma presents today for follow up of leukopenia. She is taking Folic Acid 1 mg daily on the days she can remember. She reports today that she continues to struggle with fatigue and has restarted having drenching night sweats at least every other night. She reports a good appetite, with recent weight gain. Denies fever/chills or any tender/enlarged lymph nodes. She reports chronic aches/pains of her joints. She had appointment arranged with rheumatology in [place] for January 2025 but this had to be  rescheduled due to weather. She is now scheduled to see them in June. Her main complaint today is shortness of breath that she has been experiencing for several months now. She reports that it is worse on exertion. Denies any cough. She is a former smoker, quit 3-4 years ago. Her PCP done a chest x-ray but she doesn't know the results. She also ordered for patient to have low dose CT chest which has not been scheduled yet. She is without any other complaints at this time.     PAST MEDICAL HISTORY:  Past Medical History:   Diagnosis Date    Acid reflux     Anxiety     Arthritis     Bilateral ovarian cysts     Depression     H/O blood clots     Migraines     PTSD (post-traumatic stress disorder)     Stroke 2015       PAST SURGICAL HISTORY:  Past Surgical History:   Procedure Laterality Date    COLONOSCOPY      HEMORRHOIDECTOMY      HYSTERECTOMY  2015    Partial    LAPAROSCOPIC CHOLECYSTECTOMY  06/2009       FAMILY HISTORY:  Family History   Problem Relation Age of Onset    Hypertension Mother     Myasthenia gravis Father     Meniere's disease Father     Gout Father        SOCIAL HISTORY:  Social History     Socioeconomic History    Marital status:    Tobacco Use    Smoking status: Former     Current packs/day: 0.00     Types: Cigarettes     Quit date: 2022     Years since quitting: 3.1     Passive exposure: Past    Smokeless tobacco: Never   Vaping Use    Vaping status: Never Used   Substance and Sexual Activity    Alcohol use: Never    Drug use: Never    Sexual activity: Defer     Comment:               MEDICATIONS:  The current medication list was reviewed in the EMR    Current Outpatient Medications:     ALPRAZolam (XANAX) 1 MG tablet, Take 1 tablet by mouth., Disp: , Rfl:     Aspirin Low Dose 81 MG EC tablet, , Disp: , Rfl:     cetirizine (zyrTEC) 10 MG tablet, , Disp: , Rfl:     folic acid (FOLVITE) 1 MG tablet, Take 1 tablet by mouth Daily., Disp: 30 tablet, Rfl: 5    gabapentin (NEURONTIN) 600 MG  "tablet, 1 tablet Daily., Disp: , Rfl:     ketoconazole (NIZORAL) 2 % shampoo, Apply 1 Application topically to the appropriate area as directed 1 (One) Time Per Week., Disp: , Rfl:     meclizine (ANTIVERT) 25 MG tablet, , Disp: , Rfl:     metoprolol tartrate (LOPRESSOR) 25 MG tablet, Take 1 tablet by mouth 2 (Two) Times a Day., Disp: , Rfl:     omeprazole (priLOSEC) 40 MG capsule, Take 1 capsule by mouth Daily., Disp: , Rfl:     ondansetron ODT (ZOFRAN-ODT) 4 MG disintegrating tablet, Place 1 tablet on the tongue Every 8 (Eight) Hours As Needed., Disp: , Rfl:     PARoxetine (PAXIL) 20 MG tablet, Take 1 tablet by mouth Every Morning., Disp: , Rfl:     promethazine (PHENERGAN) 25 MG tablet, Take 1 tablet by mouth Every 8 (Eight) Hours As Needed., Disp: , Rfl:     QUEtiapine (SEROquel) 100 MG tablet, Take 1 tablet by mouth Every Night., Disp: , Rfl:     Retin-A 0.05 % cream, , Disp: , Rfl:     SUMAtriptan (IMITREX) 100 MG tablet, 1 tablet 1 (One) Time As Needed., Disp: , Rfl:     tamsulosin (Flomax) 0.4 MG capsule 24 hr capsule, Take 1 capsule by mouth Daily., Disp: , Rfl:     tiZANidine (ZANAFLEX) 4 MG tablet, Take 1.5 tablets by mouth Every 8 (Eight) Hours As Needed for Muscle Spasms., Disp: , Rfl:     ALLERGIES:    Allergies   Allergen Reactions    Codeine Dizziness and Headache         REVIEW OF SYSTEMS:    A comprehensive 14 point review of systems was performed.  Significant findings as mentioned above.  All other systems reviewed and are negative.        Physical Exam   Vital Signs: /80   Pulse 96   Temp 97.7 °F (36.5 °C) (Temporal)   Resp 20   Ht 167.6 cm (65.98\")   Wt 97.8 kg (215 lb 9.6 oz)   SpO2 98%   BMI 34.82 kg/m²     ECOG score: 0   General: Well developed, well nourished, alert and oriented x 3, in no acute distress.   Head: ATNC   Eyes: PERRL, No evidence of conjunctivitis.   Nose: No nasal discharge.   Mouth: Oral mucosal membranes moist. No oral ulceration or hemorrhages.   Neck: Neck " supple. No thyromegaly. No JVD.   Lungs: Clear in all fields to A&P without rales, rhonchi or wheezing.   Heart: S1, S2. Regular rate and rhythm. No murmurs, rubs, or gallops.   Abdomen: Soft. Bowel sounds are normoactive. Nontender with palpation. No Hepatosplenomegaly can be appreciated.   Extremities: No cyanosis or edema. Peripheral pulses palpable and equal bilaterally.   Integumentary: No rash, sores, erythema or nodules. No blistering, bruising, or dry skin.   Hem/Lymph Nodes: No palpable cervical, submandibular, supraclavicular, axillary  lymphadenopathy noted. No petechiae, purpura or ecchymosis noted.   Neurologic: Grossly non-focal exam    Pain Score:  Pain Score    02/27/25 1328   PainSc: 7    PainLoc: Generalized       PHQ-Score Total:  PHQ-9 Total Score:         PATHOLOGY:        ENDOSCOPY:        IMAGING:  US Abdomen Complete (10/01/2024 09:04)   FINDINGS:  Liver:  Unremarkable as visualized.  No mass.  No intrahepatic bile  duct dilation.  Gallbladder:  Cholecystectomy.  Common bile duct:  Unremarkable as visualized.  No stones.  No  dilation.  Common bile duct measures 0.43 cm in diameter.  Pancreas:  Unremarkable as visualized.  Kidneys:  Unremarkable as visualized.  No stones.  No hydronephrosis.   The right kidney measures 9.9 cm in length.  The left kidney measures  11.8 cm in length.  Spleen:  Unremarkable as visualized.  The spleen measures 11 cm in  maximum dimension.  Aorta:  Unremarkable as visualized.  No aneurysm.  Inferior vena cava:  Unremarkable as visualized.     IMPRESSION:  No acute findings in the abdomen.    RECENT LABS:  Lab Results   Component Value Date    WBC 3.23 (L) 10/02/2024    HGB 12.7 10/02/2024    HCT 35.7 10/02/2024    MCV 98.1 (H) 10/02/2024    RDW 12.1 (L) 10/02/2024     10/02/2024    NEUTRORELPCT 41.8 (L) 10/02/2024    LYMPHORELPCT 44.6 10/02/2024    MONORELPCT 8.7 10/02/2024    EOSRELPCT 3.4 10/02/2024    BASORELPCT 1.2 10/02/2024    NEUTROABS 1.35 (L)  10/02/2024    LYMPHSABS 1.44 10/02/2024       Lab Results   Component Value Date     08/21/2024    K 4.2 08/21/2024    CO2 23.6 08/21/2024     08/21/2024    BUN 12 08/21/2024    CREATININE 0.85 08/21/2024    GLUCOSE 70 08/21/2024    CALCIUM 9.2 08/21/2024    ALKPHOS 65 08/21/2024    AST 19 08/21/2024    ALT 18 08/21/2024    BILITOT 0.7 08/21/2024    ALBUMIN 4.1 08/21/2024    PROTEINTOT 7.2 08/21/2024 11/19/2015  WBC  4.5 - 12.5 K/Cumm 3.9 Low    RBC  4.20 - 5.40 Million 3.78 Low    Hemoglobin  12.0 - 16.0 g/dL 12.8   Hematocrit  37.0 - 47.0 % 37.2   MCV  80.0 - 94.0 fL 98.4 High    MCH  27.0 - 33.0 pg 33.9 High    MCHC  33.0 - 37.0 g/dL 34.4   Platelets  130 - 400 K/Cumm 184   RDW  11.5 - 14.5 % 11.6   MPV  6.0 - 10.0 fL 9.8   Neutrophil Rel %  30.0 - 70.0 % 44.4   Lymphocyte Rel %  21.0 - 51.0 % 40.6   Monocyte Rel %  0.0 - 10.0 % 11.6 High    Eosinophil Rel %  0.0 - 5.0 % 2.6   Basophil Rel %  0.0 - 2.0 % 0.8   Immature Granulocyte Rel %  0.00 - 0.43 % 0.00   Neutrophils Absolute  1.4 - 6.5 K/Cumm 1.7   Lymphocytes Absolute  1.0 - 3.0 K/Cumm 1.6   Monocytes Absolute  0.1 - 0.9 K/Cumm 0.5   Eosinophils Absolute  0.0 - 0.7 K/Cumm 0.1   Basophils Absolute  0.0 - 0.3 K/Cumm 0.0   Abs Imm Gran  0.000 - 0.031 K/Cumm 0.000             Work Up 08/21/2024            Vitamin B-12  211 - 946 pg/mL 513     Folate  4.78 - 24.20 ng/mL 6.62     Lab Results   Component Value Date    CRP <0.30 08/21/2024     Lab Results   Component Value Date    SEDRATE 2 08/21/2024     Hepatitis B Surface Ag  Non-Reactive Non-Reactive   Hep A IgM  Non-Reactive Non-Reactive   Hep B C IgM  Non-Reactive Non-Reactive   Hepatitis C Ab  Non-Reactive Non-Reactive     HIV-1/ HIV-2  Non-Reactive Non-Reactive     NORMA Direct  Negative Positive Abnormal      Rheumatoid Factor Quantitative  0.0 - 14.0 IU/mL <10.0     BCR/ABL: Negative      ASSESSMENT & PLAN:  Ludy Lerma is a very pleasant 44 y.o. female with    1. Leukopenia  2. Macrocytosis  without anemia  - Previous available CBCs were reviewed and patient was noted to have WBC 3.4 on 04/19/2024 and WBC 3.0 on 07/23/2024, macrocytosis with normal Hg/Hct and platelets. There is a CBC available from November 2015 that showed a low WBC 3.9. Unfortunately, there are no other CBCs available to review for comparison of trend.   - She continues to struggle with fatigue but is no longer having drenching night sweats.   - CBC from initial consultation showed low WBC 3.29 with normal ANC. Hg/Hct and platelets were normal. PBS showed red blood cell macrocytosis with otherwise normal granulocyte and lymphocyte morphology, no circulating blasts identified, adequate platelets. B12 was replete. Folate was marginally low. CRP and ESR were normal. Acute Hepatitis panel and HIV panel non-reactive. NORMA was positive. RF was negative. Flow Cytometry showed granulocytes with non specific partial CD56 expression, no other immunophenotypic abnormalities. Will repeat Flow Cytometry in 3 months. BCR/ABL was negative.   - Abdominal ultrasound was unremarkable.   - Discussed with patient that direct bone marrow suppression from possible underlying autoimmune disorder as well as marginally low Folate could be contributing to leukopenia.  - Referral was placed for rheumatology and she has an appointment scheduled for June 2025.  - Advised to continue Folic Acid daily.  - Repeat CBC from today shows ongoing leukopenia (3.05). Repeat Flow Cytometry is pending from today.  - Will follow up in 3 months with repeat CBC.  - Discussed more aggressive management with possible bone marrow biopsy and she would like to continue conservative management for now.     3. Shortness of Breath  - Ongoing for several months but denies any worsening. She does report that it is worse with exertion. No cough, pleuritic pain. No fever/chills. Previous smoker, quit 3-4 years ago.  - PCP ordered chest x-ray. Recommended to follow up with PCP for  results.  - PCP also ordered low dose CT Chest which has not been scheduled at this time.         [employer] / MANJINDER/Other  Quality measures  -  Ludy Lerma did not receive 2024 flu vaccine.  This was recommended.  -  Ludy Lerma reports a pain score of 7.  Given her pain assessment as noted, treatment options were discussed and the following options were decided upon as a follow-up plan to address the patient's pain: continuation of current treatment plan for pain and referral to Primary Care for assistance in pain treatment guidance.  -  Current outpatient and discharge medications have been reconciled for the patient.  Reviewed by: CARLOS Nevarez              I spent 30 minutes caring for Ludy on this date of service. This time includes time spent by me in the following activities: preparing for the visit, reviewing tests, performing a medically appropriate examination and/or evaluation, counseling and educating the patient/family/caregiver, referring and communicating with other health care professionals, documenting information in the medical record, independently interpreting results and communicating that information with the patient/family/caregiver, care coordination, ordering medications, ordering test(s), obtaining a separately obtained history, and reviewing a separately obtained history.                                   Electronically Signed by: CARLOS Valencia , February 27, 2025 13:32 EST           Electronically Signed by: CARLOS Valencia , February 27, 2025 13:32 EST       CC:   No ref. provider found  Dorie Patton APRN

## 2025-02-27 NOTE — PROGRESS NOTES
Venipuncture Blood Specimen Collection  Venipuncture performed in left arm by Zeina Pettit MA with good hemostasis. Patient tolerated the procedure well without complications.   02/27/25   Zeina Pettit MA

## 2025-04-10 ENCOUNTER — TELEPHONE (OUTPATIENT)
Dept: ONCOLOGY | Facility: CLINIC | Age: 45
End: 2025-04-10
Payer: COMMERCIAL

## 2025-04-10 NOTE — TELEPHONE ENCOUNTER
RN spoke with patient and informed her that since her PCP is the provider who ordered the CT scan for her shortness of breath, the PCP would be responsible for the insurance authorization. This can be done by submitting and appeal or even setting up a peer to peer discussion with the insurance if possible. RN informed patient that it is her insurance that makes the difference, not the scan itself, or a provider that is in a speciality. She voiced understanding and said she would call her PCP office back. RN voiced understanding and also informed her that she can call the number on the back of her insurance card to obtain more information if needed. She voiced understanding and appreciation for the call. RN encouraged her to please call us if we can assist her.     CARLOS Ballard, made aware.

## 2025-06-24 ENCOUNTER — OFFICE VISIT (OUTPATIENT)
Age: 45
End: 2025-06-24
Payer: COMMERCIAL

## 2025-06-24 VITALS
HEART RATE: 92 BPM | DIASTOLIC BLOOD PRESSURE: 72 MMHG | HEIGHT: 66 IN | WEIGHT: 217.1 LBS | SYSTOLIC BLOOD PRESSURE: 112 MMHG | TEMPERATURE: 97.7 F | BODY MASS INDEX: 34.89 KG/M2

## 2025-06-24 DIAGNOSIS — M25.50 ARTHRALGIA, UNSPECIFIED JOINT: ICD-10-CM

## 2025-06-24 DIAGNOSIS — R53.83 FATIGUE, UNSPECIFIED TYPE: ICD-10-CM

## 2025-06-24 DIAGNOSIS — R76.8 ANA POSITIVE: Primary | ICD-10-CM

## 2025-06-24 PROCEDURE — 99204 OFFICE O/P NEW MOD 45 MIN: CPT | Performed by: INTERNAL MEDICINE

## 2025-06-24 PROCEDURE — 1160F RVW MEDS BY RX/DR IN RCRD: CPT | Performed by: INTERNAL MEDICINE

## 2025-06-24 PROCEDURE — 1159F MED LIST DOCD IN RCRD: CPT | Performed by: INTERNAL MEDICINE

## 2025-06-24 RX ORDER — ERGOCALCIFEROL 1.25 MG/1
50000 CAPSULE, LIQUID FILLED ORAL
COMMUNITY
Start: 2025-05-12

## 2025-06-24 RX ORDER — ALBUTEROL SULFATE 90 UG/1
2 AEROSOL, METERED RESPIRATORY (INHALATION) EVERY 6 HOURS PRN
COMMUNITY
Start: 2025-05-12

## 2025-06-24 RX ORDER — PAROXETINE 30 MG/1
30 TABLET, FILM COATED ORAL EVERY MORNING
COMMUNITY
Start: 2025-06-16

## 2025-06-24 RX ORDER — CARIPRAZINE 1.5 MG/1
1.5 CAPSULE, GELATIN COATED ORAL DAILY
COMMUNITY
Start: 2025-05-30

## 2025-06-24 RX ORDER — BENZOYL PEROXIDE 100 MG/ML
1 GEL TOPICAL
COMMUNITY
Start: 2025-05-12

## 2025-06-24 RX ORDER — FLUTICASONE PROPIONATE 50 MCG
1 SPRAY, SUSPENSION (ML) NASAL DAILY
COMMUNITY
Start: 2025-05-12

## 2025-06-24 RX ORDER — FEXOFENADINE HYDROCHLORIDE 180 MG/1
180 TABLET, FILM COATED ORAL DAILY
COMMUNITY
Start: 2025-05-12

## 2025-06-24 RX ORDER — LIDOCAINE AND PRILOCAINE 25; 25 MG/G; MG/G
1 CREAM TOPICAL
COMMUNITY
Start: 2025-04-12

## 2025-06-24 NOTE — PROGRESS NOTES
"                 Office Visit       Date: 06/24/2025   Patient Name: Ludy Lerma  MRN: [id number]  YOB: 1980    Referring Physician: Nellie Vann A*     Chief Complaint   Patient presents with    Abnormal Lab     NORMA +        History of Present Illness: Ludy Lerma is a 45 y.o. female who is here today at the request of Nellie GONZALEZ. The referral indicates that she is NORMA test positive. There is now concern that she may have an autoimmune disease. The patient reports that she has rheumatoid arthritis. She reports that she has arthritis \"all over body\".     Today she rates her pain as 10/10 in severity. She reports having + 12 hours/day of morning stiffness. No red or hot joints. She has back and neck pain. Her neck is stiff. She has trouble walking. She has muscle pain and weakness. She has had pain for 13 years.     She has dry mouth but not dry eyes. She is short of breath. No chest pain. She has indigestion and nausea. She has pelvic pain/pressure. She is heat and cold intolerant. She bruises easily. No lymphadenopathy. No rash. No hair loss. She siva headaches. She has memory problems. She deals with confusion and dizziness.     She has a history of blood clots. No recent serious injuries or infections. No fever. She is fatigued. No history of psoriasis, gout, or Raynaud's. No history of uveitis, iritis, or scleritis. No oral, nasal, or genital ulcers. She has seen hematology for leukopenia.     Medication/treatment/interventions tried include: Tylenol, tizanidine, gabapentin, Excedrin, paroxetine, cyclobenzaprine, she saw hematology, aspirin, BC powder, Aleve   Studies reviewed included:     Lab Results   Component Value Date     WBC 3.23 (L) 10/02/2024     HGB 12.7 10/02/2024     HCT 35.7 10/02/2024     MCV 98.1 (H) 10/02/2024     RDW 12.1 (L) 10/02/2024      10/02/2024     NEUTRORELPCT 41.8 (L) 10/02/2024     LYMPHORELPCT 44.6 10/02/2024     MONORELPCT 8.7 " 10/02/2024     EOSRELPCT 3.4 10/02/2024     BASORELPCT 1.2 10/02/2024     NEUTROABS 1.35 (L) 10/02/2024     LYMPHSABS 1.44 10/02/2024               Lab Results   Component Value Date      08/21/2024     K 4.2 08/21/2024     CO2 23.6 08/21/2024      08/21/2024     BUN 12 08/21/2024     CREATININE 0.85 08/21/2024     GLUCOSE 70 08/21/2024     CALCIUM 9.2 08/21/2024     ALKPHOS 65 08/21/2024     AST 19 08/21/2024     ALT 18 08/21/2024     BILITOT 0.7 08/21/2024     ALBUMIN 4.1 08/21/2024     PROTEINTOT 7.2 08/21/2024 11/19/2015  WBC  4.5 - 12.5 K/Cumm 3.9 Low    RBC  4.20 - 5.40 Million 3.78 Low    Hemoglobin  12.0 - 16.0 g/dL 12.8   Hematocrit  37.0 - 47.0 % 37.2   MCV  80.0 - 94.0 fL 98.4 High    MCH  27.0 - 33.0 pg 33.9 High    MCHC  33.0 - 37.0 g/dL 34.4   Platelets  130 - 400 K/Cumm 184   RDW  11.5 - 14.5 % 11.6   MPV  6.0 - 10.0 fL 9.8   Neutrophil Rel %  30.0 - 70.0 % 44.4   Lymphocyte Rel %  21.0 - 51.0 % 40.6   Monocyte Rel %  0.0 - 10.0 % 11.6 High    Eosinophil Rel %  0.0 - 5.0 % 2.6   Basophil Rel %  0.0 - 2.0 % 0.8   Immature Granulocyte Rel %  0.00 - 0.43 % 0.00   Neutrophils Absolute  1.4 - 6.5 K/Cumm 1.7   Lymphocytes Absolute  1.0 - 3.0 K/Cumm 1.6   Monocytes Absolute  0.1 - 0.9 K/Cumm 0.5   Eosinophils Absolute  0.0 - 0.7 K/Cumm 0.1   Basophils Absolute  0.0 - 0.3 K/Cumm 0.0   Abs Imm Gran  0.000 - 0.031 K/Cumm 0.000                Work Up 08/21/2024               Vitamin B-12  211 - 946 pg/mL 513      Folate  4.78 - 24.20 ng/mL 6.62            Lab Results   Component Value Date     CRP <0.30 08/21/2024            Lab Results   Component Value Date     SEDRATE 2 08/21/2024      Hepatitis B Surface Ag  Non-Reactive Non-Reactive   Hep A IgM  Non-Reactive Non-Reactive   Hep B C IgM  Non-Reactive Non-Reactive   Hepatitis C Ab  Non-Reactive Non-Reactive      HIV-1/ HIV-2  Non-Reactive Non-Reactive      NORMA Direct  Negative Positive Abnormal       Rheumatoid Factor Quantitative  0.0 - 14.0  IU/mL <10.0      BCR/ABL: Negative    US Abdomen Complete (10/01/2024 09:04)   FINDINGS:  Liver:  Unremarkable as visualized.  No mass.  No intrahepatic bile  duct dilation.  Gallbladder:  Cholecystectomy.  Common bile duct:  Unremarkable as visualized.  No stones.  No  dilation.  Common bile duct measures 0.43 cm in diameter.  Pancreas:  Unremarkable as visualized.  Kidneys:  Unremarkable as visualized.  No stones.  No hydronephrosis.   The right kidney measures 9.9 cm in length.  The left kidney measures  11.8 cm in length.  Spleen:  Unremarkable as visualized.  The spleen measures 11 cm in  maximum dimension.  Aorta:  Unremarkable as visualized.  No aneurysm.  Inferior vena cava:  Unremarkable as visualized.  Subjective     Review of Systems   Constitutional:  Positive for fatigue.   HENT: Negative.     Eyes: Negative.    Respiratory: Negative.     Cardiovascular: Negative.    Gastrointestinal: Negative.    Endocrine: Negative.    Genitourinary: Negative.    Musculoskeletal:  Positive for arthralgias.   Skin: Negative.    Allergic/Immunologic: Negative.    Neurological: Negative.    Hematological: Negative.    Psychiatric/Behavioral: Negative.     All other systems reviewed and are negative.       Past Medical History:   Diagnosis Date    Acid reflux     Anxiety     Arthritis     Bilateral ovarian cysts     Depression     GERD (gastroesophageal reflux disease)     H/O blood clots     Hypertension     Kidney stones     Migraines     PTSD (post-traumatic stress disorder)     Stroke 2015       Past Surgical History:   Procedure Laterality Date    COLONOSCOPY      HEMORRHOIDECTOMY      HYSTERECTOMY  2015    Partial    LAPAROSCOPIC CHOLECYSTECTOMY  06/2009       Family History   Problem Relation Age of Onset    Hypertension Mother     Hypertension Father     Myasthenia gravis Father     Meniere's disease Father     Gout Father        Social History     Socioeconomic History    Marital status:    Tobacco Use     Smoking status: Former     Current packs/day: 0.00     Types: Cigarettes     Quit date: 2022     Years since quitting: 3.4     Passive exposure: Past    Smokeless tobacco: Never   Vaping Use    Vaping status: Never Used   Substance and Sexual Activity    Alcohol use: Never    Drug use: Never    Sexual activity: Defer     Comment:          Current Outpatient Medications:     Acne Medication 10 10 % gel, Apply 1 Application topically to the appropriate area as directed., Disp: , Rfl:     Allergy Relief 180 MG tablet, Take 1 tablet by mouth Daily., Disp: , Rfl:     ALPRAZolam (XANAX) 1 MG tablet, Take 1 tablet by mouth., Disp: , Rfl:     Aspirin Low Dose 81 MG EC tablet, , Disp: , Rfl:     cetirizine (zyrTEC) 10 MG tablet, , Disp: , Rfl:     clindamycin 1 % gel, Apply 1 Application topically to the appropriate area as directed., Disp: , Rfl:     fluticasone (FLONASE) 50 MCG/ACT nasal spray, Administer 1 spray into the nostril(s) as directed by provider Daily., Disp: , Rfl:     folic acid (FOLVITE) 1 MG tablet, Take 1 tablet by mouth Daily., Disp: 30 tablet, Rfl: 5    gabapentin (NEURONTIN) 600 MG tablet, 1 tablet Daily., Disp: , Rfl:     ketoconazole (NIZORAL) 2 % shampoo, Apply 1 Application topically to the appropriate area as directed 1 (One) Time Per Week., Disp: , Rfl:     lidocaine-prilocaine (EMLA) 2.5-2.5 % cream, Apply 1 Application topically to the appropriate area as directed., Disp: , Rfl:     meclizine (ANTIVERT) 25 MG tablet, , Disp: , Rfl:     metoprolol tartrate (LOPRESSOR) 25 MG tablet, Take 1 tablet by mouth 2 (Two) Times a Day., Disp: , Rfl:     omeprazole (priLOSEC) 40 MG capsule, Take 1 capsule by mouth Daily., Disp: , Rfl:     ondansetron ODT (ZOFRAN-ODT) 4 MG disintegrating tablet, Place 1 tablet on the tongue Every 8 (Eight) Hours As Needed., Disp: , Rfl:     PARoxetine (PAXIL) 30 MG tablet, Take 1 tablet by mouth Every Morning., Disp: , Rfl:     promethazine (PHENERGAN) 25 MG tablet,  "Take 1 tablet by mouth Every 8 (Eight) Hours As Needed., Disp: , Rfl:     QUEtiapine (SEROquel) 100 MG tablet, Take 1 tablet by mouth Every Night., Disp: , Rfl:     Retin-A 0.05 % cream, , Disp: , Rfl:     SUMAtriptan (IMITREX) 100 MG tablet, 1 tablet 1 (One) Time As Needed., Disp: , Rfl:     tiZANidine (ZANAFLEX) 4 MG tablet, Take 1.5 tablets by mouth Every 8 (Eight) Hours As Needed for Muscle Spasms., Disp: , Rfl:     Ventolin  (90 Base) MCG/ACT inhaler, Inhale 2 puffs Every 6 (Six) Hours As Needed., Disp: , Rfl:     vitamin D (ERGOCALCIFEROL) 1.25 MG (16927 UT) capsule capsule, Take 1 capsule by mouth Every 7 (Seven) Days., Disp: , Rfl:     Vraylar 1.5 MG capsule capsule, Take 1 capsule by mouth Daily., Disp: , Rfl:     Allergies   Allergen Reactions    Codeine Dizziness and Headache       I reviewed the patient's chief complaint, history of present illness, review of systems, past medical history, surgical history, family history, social history, medications and allergy list.     Objective      Vitals:    06/24/25 1503   BP: 112/72   BP Location: Right arm   Patient Position: Sitting   Cuff Size: Large Adult   Pulse: 92   Temp: 97.7 °F (36.5 °C)   Weight: 98.5 kg (217 lb 1.6 oz)   Height: 167.6 cm (65.98\")   PainSc: 10-Worst pain ever     Body mass index is 35.06 kg/m².       Physical Exam     General: Well appearing 45 year old  female. Not in distress. She is ambulating unassisted.   SKIN: No rashes. No alopecia. No subcutaneous nodules. No digital pits or ulcers. No sclerodactyly. She has tattoos.   HEENT: NCAT. Conjunctiva clear, no photophobia. No oral or nasal ulcers. Hearing intact.    Pulmonary: Clear to auscultation bilaterally. No wheezing, rales, or rhonchi.  CV: Regular rate and rhythm. No murmurs, rubs, or gallops.   Psych: Normal mood and affect. Alert and oriented x 3.   Extremities: No cyanosis or edema.   Musculoskeletal: No joint swelling or tenderness to palpation. No warmth or " erythema. Normal range of motion of the wrists, ankles, elbows, and knees.   Lymph: No palpable cervical adenopathy    Procedures    Assessment / Plan      Assessment & Plan  Arthralgia, unspecified joint  History of leukopenia  History of blood clots   8/21/24: NORMA Direct positive, DS DNA normal, RF negative, CMP normal, B12 normal, Folate normal, CRP normal, Sed rate normal, hepatitis tests negative, WBCs 3.29, CBC ok otherwise, HIV non reactive   Medication/treatment/interventions tried include: Tylenol, tizanidine, gabapentin, Excedrin, paroxetine, cyclobenzaprine, she saw hematology, aspirin, BC powder, Aleve   The patient was reported to have a + Direct NORMA test. See below  We will evaluate further for autoimmune disease/inflammatory types of arthritis.   Follow up in 2-3 months   I suspect she has fibromyalgia.   She is on gabapentin   She is on Paroxetine  She has tried Tylenol PRN   She has tried oral NSAIDS   She has taken muscle relaxer's like tizanidine   She is concerned she may have RA.   8/21/24 RF, CRP, and CRP all were normal.   Orders:    QuantiFERON-TB Gold Plus ([facility])    14.3.3 ETA, Rheum. Arthritis    NORMA 12 Plus Profile (RDL)    NORMA by IFA, Reflex to Titer and Pattern    ANCA Panel    Beta-2 Glycoprotein Antibodies    CBC Auto Differential    CK    Comprehensive Metabolic Panel    C-reactive Protein    Cyclic Citrul Peptide Antibody, IgG / IgA    HLA-B27 Antigen    Lupus Anticoag / Cardiolipin Ab    RF Isotypes, IgG, IgA, IgM EIA    Sedimentation Rate    Thyroid Antibodies    TSH+Free T4    UA / M With / Rflx Culture([facility] ONLY) - Urine, Clean Catch    Aldolase    XR Foot 3+ View Bilateral; Future    XR Hand 2 View Bilateral; Future    XR Sacroiliac Joints 3+ View    XR Chest 2 View; Future    Fatigue, unspecified type    Labs and x-rays to evaluate for autoimmune conditions. We usually contact patients with these results within 10-14 business days    Orders:    QuantiFERON-TB Gold Plus  ([facility])    14.3.3 ETA, Rheum. Arthritis    NORMA 12 Plus Profile (RDL)    NORMA by IFA, Reflex to Titer and Pattern    ANCA Panel    Beta-2 Glycoprotein Antibodies    CBC Auto Differential    CK    Comprehensive Metabolic Panel    C-reactive Protein    Cyclic Citrul Peptide Antibody, IgG / IgA    HLA-B27 Antigen    Lupus Anticoag / Cardiolipin Ab    RF Isotypes, IgG, IgA, IgM EIA    Sedimentation Rate    Thyroid Antibodies    TSH+Free T4    UA / M With / Rflx Culture([facility] ONLY) - Urine, Clean Catch    Aldolase    XR Foot 3+ View Bilateral; Future    XR Hand 2 View Bilateral; Future    XR Sacroiliac Joints 3+ View    XR Chest 2 View; Future    NORMA positive  We reviewed the outside labs and discussed them at length. All of the patients questions were answered.   Handout on + NORMA tests was given to the patient to take home.   An NORMA test is a non specific test. While it certainly can be positive in conditions like SLE, scleroderma, myositis, Sjögren's, etc.. It can also be positive in patients with thyroid disease, type 1 diabetes, psoriasis, Celiacs disease, inflammatory bowel disease, etc.. There are also reports of normal/apparently healthy individuals who have been incidentally found to have a +NORMA test. You can also sometimes get a false positive NORMA test.   Labs today to try and determine the significance of this +NORMA test. Patient will be contacted once these test results are available.     Orders:    QuantiFERON-TB Gold Plus ([facility]Hep)    14.3.3 ETA, Rheum. Arthritis    NORMA 12 Plus Profile (RDL)    NORMA by IFA, Reflex to Titer and Pattern    ANCA Panel    Beta-2 Glycoprotein Antibodies    CBC Auto Differential    CK    Comprehensive Metabolic Panel    C-reactive Protein    Cyclic Citrul Peptide Antibody, IgG / IgA    HLA-B27 Antigen    Lupus Anticoag / Cardiolipin Ab    RF Isotypes, IgG, IgA, IgM EIA    Sedimentation Rate    Thyroid Antibodies    TSH+Free T4    UA / M With / Rflx Culture([facility] ONLY) - Urine,  Clean Catch    Aldolase    XR Foot 3+ View Bilateral; Future    XR Hand 2 View Bilateral; Future    XR Sacroiliac Joints 3+ View    XR Chest 2 View; Future      Follow Up:   Return in about 3 months (around 9/24/2025).    Abdi Alvarez DO  [facility] [place]

## 2025-06-25 ENCOUNTER — RESULTS FOLLOW-UP (OUTPATIENT)
Age: 45
End: 2025-06-25
Payer: COMMERCIAL

## 2025-06-27 ENCOUNTER — PATIENT ROUNDING (BHMG ONLY) (OUTPATIENT)
Age: 45
End: 2025-06-27
Payer: COMMERCIAL

## 2025-06-27 NOTE — PROGRESS NOTES
THE FOLLOWING MESSAGE SENT TO THE PATIENT VIA [employer]:    This is Terra, I am the patient access supervisor for your new provider at [facility].  I am reaching out to welcome you to our practice! We feel that patient feedback is crucial to ensuring that we provide the highest quality of care and would like to take this opportunity to ask a few questions about your recent visit.      Thinking about your recent visit with us, what things went well?    We strive to ensure that we protect your safety and privacy.  Is there anything we could have done to improve this during your visit?    We are always looking for ways to make each patients experience better.  Do you have any suggestions on how we may improve?    Overall, were you satisfied with your first visit to our practice?    Do you have any questions regarding your visit?    Thank you for taking the time to answer these questions today.  Please feel free to contact our office at [phone] with any questions or concerns.    Thank you and have a wonderful day!    PATIENT DID NOT RESPOND

## 2025-07-07 LAB
14-3-3 ETA AG SER IA-MCNC: <0.2 NG/ML
ALBUMIN SERPL-MCNC: 4.4 G/DL (ref 3.9–4.9)
ALDOLASE SERPL-CCNC: 4.1 U/L (ref 3.3–10.3)
ALP SERPL-CCNC: 65 IU/L (ref 44–121)
ALT SERPL-CCNC: 19 IU/L (ref 0–32)
ANA PLUS 12 INTERPRETATION: ABNORMAL
ANA SER QL IF: POSITIVE
ANA SER QL IF: POSITIVE
ANA SPECKLED TITR SER: ABNORMAL {TITER}
ANA SPECKLED TITR SER: ABNORMAL {TITER}
APPEARANCE UR: CLEAR
APTT HEX PL PPP: 3 SEC
APTT IMM NP PPP: NORMAL SEC
APTT PPP 1:1 SALINE: NORMAL SEC
APTT PPP: 28.3 SEC
AST SERPL-CCNC: 22 IU/L (ref 0–40)
B2 GLYCOPROT1 IGA SER-ACNC: <10 SAU
B2 GLYCOPROT1 IGA SER-ACNC: <9 GPI IGA UNITS (ref 0–25)
B2 GLYCOPROT1 IGG SER-ACNC: <10 SGU
B2 GLYCOPROT1 IGG SER-ACNC: <9 GPI IGG UNITS (ref 0–20)
B2 GLYCOPROT1 IGM SER-ACNC: <10 SMU
B2 GLYCOPROT1 IGM SER-ACNC: <9 GPI IGM UNITS (ref 0–32)
BACTERIA #/AREA URNS HPF: ABNORMAL /[HPF]
BACTERIA UR CULT: NORMAL
BACTERIA UR CULT: NORMAL
BASOPHILS # BLD AUTO: 0.1 X10E3/UL (ref 0–0.2)
BASOPHILS NFR BLD AUTO: 1 %
BILIRUB SERPL-MCNC: 0.3 MG/DL (ref 0–1.2)
BILIRUB UR QL STRIP: NEGATIVE
BUN SERPL-MCNC: 15 MG/DL (ref 6–24)
BUN/CREAT SERPL: 14 (ref 9–23)
C-ANCA TITR SER IF: NORMAL TITER
C3 SERPL-MCNC: 143 MG/DL (ref 90–180)
C4 SERPL-MCNC: 35 MG/DL (ref 10–40)
CALCIUM SERPL-MCNC: 9.4 MG/DL (ref 8.7–10.2)
CARDIOLIPIN IGA SER IA-ACNC: <12 APL U/ML
CARDIOLIPIN IGG SER IA-ACNC: <10 GPL
CARDIOLIPIN IGG SER IA-ACNC: <15 GPL U/ML
CARDIOLIPIN IGM SER IA-ACNC: <10 MPL
CARDIOLIPIN IGM SER IA-ACNC: <13 MPL U/ML
CASTS URNS QL MICRO: ABNORMAL /LPF
CCP IGA+IGG SERPL IA-ACNC: 4 UNITS (ref 0–19)
CCP IGA+IGG SERPL IA-ACNC: <20 UNITS
CENTROMERE AB TITR SER IF: ABNORMAL {TITER}
CHLORIDE SERPL-SCNC: 106 MMOL/L (ref 96–106)
CHROMATIN IGG SERPL-ACNC: <20 UNITS
CK SERPL-CCNC: 96 U/L (ref 32–182)
CO2 SERPL-SCNC: 20 MMOL/L (ref 20–29)
COLOR UR: YELLOW
CONFIRM DRVVT: NORMAL SEC
CREAT SERPL-MCNC: 1.11 MG/DL (ref 0.57–1)
CRP SERPL-MCNC: <1 MG/L (ref 0–10)
CRYSTALS URNS MICRO: ABNORMAL
DRVVT SCREEN TO CONFIRM RATIO: NORMAL RATIO
DSDNA AB SER FARR-ACNC: <8 IU/ML
EGFRCR SERPLBLD CKD-EPI 2021: 63 ML/MIN/1.73
ENA SCL70 AB SER IA-ACNC: <20 UNITS
ENA SM AB SER-ACNC: <20 UNITS
ENA SS-A AB SER IA-ACNC: <20 UNITS
ENA SS-B AB SER IA-ACNC: <20 UNITS
EOSINOPHIL # BLD AUTO: 0.1 X10E3/UL (ref 0–0.4)
EOSINOPHIL NFR BLD AUTO: 2 %
EPI CELLS #/AREA URNS HPF: ABNORMAL /HPF (ref 0–10)
ERYTHROCYTE [DISTWIDTH] IN BLOOD BY AUTOMATED COUNT: 12.1 % (ref 11.7–15.4)
ERYTHROCYTE [SEDIMENTATION RATE] IN BLOOD BY WESTERGREN METHOD: 2 MM/HR (ref 0–32)
GAMMA INTERFERON BACKGROUND BLD IA-ACNC: 0.04 IU/ML
GLOBULIN SER CALC-MCNC: 2.5 G/DL (ref 1.5–4.5)
GLUCOSE SERPL-MCNC: 100 MG/DL (ref 70–99)
GLUCOSE UR QL STRIP: NEGATIVE
HCT VFR BLD AUTO: 40.3 % (ref 34–46.6)
HGB BLD-MCNC: 13.3 G/DL (ref 11.1–15.9)
HGB UR QL STRIP: ABNORMAL
HLA-B27 QL NAA+PROBE: NEGATIVE
IMM GRANULOCYTES # BLD AUTO: 0 X10E3/UL (ref 0–0.1)
IMM GRANULOCYTES NFR BLD AUTO: 0 %
INR PPP: 0.9 RATIO
KETONES UR QL STRIP: NEGATIVE
LABORATORY COMMENT REPORT: ABNORMAL
LABORATORY COMMENT REPORT: NORMAL
LEUKOCYTE ESTERASE UR QL STRIP: ABNORMAL
LYMPHOCYTES # BLD AUTO: 1.5 X10E3/UL (ref 0.7–3.1)
LYMPHOCYTES NFR BLD AUTO: 34 %
Lab: ABNORMAL
M TB IFN-G BLD-IMP: NEGATIVE
M TB IFN-G CD4+ BCKGRND COR BLD-ACNC: 0.04 IU/ML
M TB IFN-G CD4+CD8+ BCKGRND COR BLD-ACNC: 0.05 IU/ML
MCH RBC QN AUTO: 35.1 PG (ref 26.6–33)
MCHC RBC AUTO-ENTMCNC: 33 G/DL (ref 31.5–35.7)
MCV RBC AUTO: 106 FL (ref 79–97)
MICRO URNS: ABNORMAL
MITOGEN IGNF BCKGRD COR BLD-ACNC: >10 IU/ML
MONOCYTES # BLD AUTO: 0.3 X10E3/UL (ref 0.1–0.9)
MONOCYTES NFR BLD AUTO: 8 %
MYELOPEROXIDASE AB SER IA-ACNC: <0.2 UNITS (ref 0–0.9)
NEUTROPHILS # BLD AUTO: 2.3 X10E3/UL (ref 1.4–7)
NEUTROPHILS NFR BLD AUTO: 55 %
NITRITE UR QL STRIP: NEGATIVE
P-ANCA ATYPICAL TITR SER IF: NORMAL TITER
P-ANCA TITR SER IF: NORMAL TITER
PH UR STRIP: 5.5 [PH] (ref 5–7.5)
PLATELET # BLD AUTO: 230 X10E3/UL (ref 150–450)
POTASSIUM SERPL-SCNC: 4.1 MMOL/L (ref 3.5–5.2)
PROT SERPL-MCNC: 6.9 G/DL (ref 6–8.5)
PROT UR QL STRIP: ABNORMAL
PROTEINASE3 AB SER IA-ACNC: <0.2 UNITS (ref 0–0.9)
PROTHROMBIN TIME: 10.1 SEC
QUANTIFERON INCUBATION: NORMAL
RBC # BLD AUTO: 3.79 X10E6/UL (ref 3.77–5.28)
RBC #/AREA URNS HPF: ABNORMAL /HPF (ref 0–2)
RF IGA SER-ACNC: <7 U
RF IGG SER-ACNC: <7 U
RF IGM SER IA-ACNC: <7 U
RHEUMATOID FACT SERPL-ACNC: <14 IU/ML
SCREEN DRVVT: 35.2 SEC
SERVICE CMNT-IMP: NORMAL
SODIUM SERPL-SCNC: 140 MMOL/L (ref 134–144)
SP GR UR STRIP: >=1.03 (ref 1–1.03)
T4 FREE SERPL-MCNC: 0.96 NG/DL (ref 0.82–1.77)
THROMBIN TIME: 18.6 SEC
THYROGLOB AB SERPL-ACNC: <1 IU/ML (ref 0–0.9)
THYROPEROXIDASE AB SERPL-ACNC: <9 IU/ML (ref 0–34)
THYROPEROXIDASE AB SERPL-ACNC: ABNORMAL IU/ML
TSH SERPL DL<=0.005 MIU/L-ACNC: 1.88 UIU/ML (ref 0.45–4.5)
U1 SNRNP AB SER IA-ACNC: <20 UNITS
UNIDENT CRYS URNS QL MICRO: PRESENT
URINALYSIS REFLEX: ABNORMAL
UROBILINOGEN UR STRIP-MCNC: 1 MG/DL (ref 0.2–1)
WBC # BLD AUTO: 4.3 X10E3/UL (ref 3.4–10.8)
WBC #/AREA URNS HPF: ABNORMAL /HPF (ref 0–5)

## 2025-07-22 DIAGNOSIS — D72.819 LEUKOPENIA, UNSPECIFIED TYPE: Primary | ICD-10-CM

## 2025-07-23 ENCOUNTER — OFFICE VISIT (OUTPATIENT)
Dept: ONCOLOGY | Facility: CLINIC | Age: 45
End: 2025-07-23
Payer: COMMERCIAL

## 2025-07-23 ENCOUNTER — LAB (OUTPATIENT)
Dept: ONCOLOGY | Facility: CLINIC | Age: 45
End: 2025-07-23
Payer: COMMERCIAL

## 2025-07-23 VITALS
RESPIRATION RATE: 16 BRPM | HEART RATE: 91 BPM | BODY MASS INDEX: 35.42 KG/M2 | TEMPERATURE: 97.3 F | SYSTOLIC BLOOD PRESSURE: 115 MMHG | OXYGEN SATURATION: 98 % | DIASTOLIC BLOOD PRESSURE: 81 MMHG | HEIGHT: 66 IN | WEIGHT: 220.4 LBS

## 2025-07-23 DIAGNOSIS — D72.819 LEUKOPENIA, UNSPECIFIED TYPE: ICD-10-CM

## 2025-07-23 DIAGNOSIS — E53.8 FOLATE DEFICIENCY: ICD-10-CM

## 2025-07-23 DIAGNOSIS — G89.4 CHRONIC PAIN SYNDROME: Primary | ICD-10-CM

## 2025-07-23 LAB
DEPRECATED RDW RBC AUTO: 42.4 FL (ref 37–54)
EOSINOPHIL # BLD MANUAL: 0.03 10*3/MM3 (ref 0–0.4)
EOSINOPHIL NFR BLD MANUAL: 1 % (ref 0.3–6.2)
ERYTHROCYTE [DISTWIDTH] IN BLOOD BY AUTOMATED COUNT: 11.7 % (ref 12.3–15.4)
HCT VFR BLD AUTO: 37.4 % (ref 34–46.6)
HGB BLD-MCNC: 13.5 G/DL (ref 12–15.9)
LYMPHOCYTES # BLD MANUAL: 1.25 10*3/MM3 (ref 0.7–3.1)
LYMPHOCYTES NFR BLD MANUAL: 5 % (ref 5–12)
MCH RBC QN AUTO: 35.5 PG (ref 26.6–33)
MCHC RBC AUTO-ENTMCNC: 36.1 G/DL (ref 31.5–35.7)
MCV RBC AUTO: 98.4 FL (ref 79–97)
MONOCYTES # BLD: 0.17 10*3/MM3 (ref 0.1–0.9)
NEUTROPHILS # BLD AUTO: 1.92 10*3/MM3 (ref 1.7–7)
NEUTROPHILS NFR BLD MANUAL: 57 % (ref 42.7–76)
PLAT MORPH BLD: NORMAL
PLATELET # BLD AUTO: 206 10*3/MM3 (ref 140–450)
PMV BLD AUTO: 9.3 FL (ref 6–12)
RBC # BLD AUTO: 3.8 10*6/MM3 (ref 3.77–5.28)
RBC MORPH BLD: NORMAL
VARIANT LYMPHS NFR BLD MANUAL: 37 % (ref 19.6–45.3)
WBC NRBC COR # BLD AUTO: 3.37 10*3/MM3 (ref 3.4–10.8)

## 2025-07-23 PROCEDURE — 1125F AMNT PAIN NOTED PAIN PRSNT: CPT | Performed by: NURSE PRACTITIONER

## 2025-07-23 PROCEDURE — 99214 OFFICE O/P EST MOD 30 MIN: CPT | Performed by: NURSE PRACTITIONER

## 2025-07-23 PROCEDURE — 1160F RVW MEDS BY RX/DR IN RCRD: CPT | Performed by: NURSE PRACTITIONER

## 2025-07-23 PROCEDURE — 85025 COMPLETE CBC W/AUTO DIFF WBC: CPT | Performed by: NURSE PRACTITIONER

## 2025-07-23 PROCEDURE — 1159F MED LIST DOCD IN RCRD: CPT | Performed by: NURSE PRACTITIONER

## 2025-07-23 PROCEDURE — 85007 BL SMEAR W/DIFF WBC COUNT: CPT | Performed by: NURSE PRACTITIONER

## 2025-07-23 NOTE — PROGRESS NOTES
Venipuncture Blood Specimen Collection  Venipuncture performed in Right arm by Elian Orellana MA with good hemostasis. Patient tolerated the procedure well without complications.   07/23/25   Elian Orellana MA

## 2025-07-23 NOTE — PROGRESS NOTES
DATE OF FOLLOW UP:  7/23/2025    REASON FOR REFERRAL: Leukopenia    REFERRING PHYSICIAN:  No ref. provider found    CHIEF COMPLAINT:  Fatigue, Drenching Night Sweats, Chronic aches/pains    TREATMENT:  Folic Acid 1 mg daily        HISTORY OF PRESENT ILLNESS:   Ludy Lerma is a very pleasant 45 y.o. female who is being seen today at the request of No ref. provider found for evaluation and treatment of leukopenia. Ms. Lerma reports following with her PCP monthly with lab testing as needed. She was only recently made aware of this issue. Previous available CBCs were reviewed and patient was noted to have WBC 3.4 on 04/19/2024 and WBC 3.0 on 07/23/2024, macrocytosis with normal Hg/Hct and platelets. There is a CBC available from November 2015 that showed a low WBC 3.9. Unfortunately, there are no other CBCs available to review for comparison of trend. Since around April of this year she reports that she has had increasing fatigue and drenching night sweats requiring her to change her clothes at least twice weekly. She reports a fair appetite with weight loss of about 40 pounds. She does report that weight loss has been intentional. Denies fever/chills. Denies any recent infections. No recurrent infections. No tender/enlarged lymph nodes. Of note, she has previously been on Seroquel for insomnia but reports that she has been off of this medication for about one year. She is without any other complaints at this time.     INTERVAL HISTORY:  Ms. Lerma presents today for follow up of leukopenia. She is taking Folic Acid 1 mg daily on the days she can remember. She reports today that she continues to struggle with fatigue and has drenching night sweats at least every other night. She reports a good appetite, with stable weight. Denies fever/chills or any tender/enlarged lymph nodes. She reports generalized chronic aches/pains. She reports using 2 boxes (60 packs each) of Goody's powders in the month of June. She was  evaluated by rheumatology in June and was diagnosed with fibromyalgia. She continues to struggle with shortness of breath but believes this is somewhat improved since her last presentation. She was a no show for CT Chest. Denies fever/chills. She is without any other complaints at this time.     PAST MEDICAL HISTORY:  Past Medical History:   Diagnosis Date    Acid reflux     Anxiety     Arthritis     Bilateral ovarian cysts     Depression     Fibromyalgia 2025    GERD (gastroesophageal reflux disease)     H/O blood clots     Hypertension     Kidney stones     Migraines     PTSD (post-traumatic stress disorder)     Stroke 2015       PAST SURGICAL HISTORY:  Past Surgical History:   Procedure Laterality Date    COLONOSCOPY      HEMORRHOIDECTOMY      HYSTERECTOMY  2015    Partial    LAPAROSCOPIC CHOLECYSTECTOMY  06/2009       FAMILY HISTORY:  Family History   Problem Relation Age of Onset    Hypertension Mother     Hypertension Father     Myasthenia gravis Father     Meniere's disease Father     Gout Father        SOCIAL HISTORY:  Social History     Socioeconomic History    Marital status:    Tobacco Use    Smoking status: Former     Current packs/day: 0.00     Types: Cigarettes     Quit date: 2022     Years since quitting: 3.5     Passive exposure: Past    Smokeless tobacco: Never   Vaping Use    Vaping status: Never Used   Substance and Sexual Activity    Alcohol use: Never    Drug use: Never    Sexual activity: Defer     Comment:               MEDICATIONS:  The current medication list was reviewed in the EMR    Current Outpatient Medications:     Acne Medication 10 10 % gel, Apply 1 Application topically to the appropriate area as directed., Disp: , Rfl:     Allergy Relief 180 MG tablet, Take 1 tablet by mouth Daily., Disp: , Rfl:     ALPRAZolam (XANAX) 1 MG tablet, Take 1 tablet by mouth., Disp: , Rfl:     Aspirin Low Dose 81 MG EC tablet, , Disp: , Rfl:     cetirizine (zyrTEC) 10 MG tablet, , Disp: ,  Rfl:     clindamycin 1 % gel, Apply 1 Application topically to the appropriate area as directed., Disp: , Rfl:     fluticasone (FLONASE) 50 MCG/ACT nasal spray, Administer 1 spray into the nostril(s) as directed by provider Daily., Disp: , Rfl:     folic acid (FOLVITE) 1 MG tablet, Take 1 tablet by mouth Daily., Disp: 30 tablet, Rfl: 5    gabapentin (NEURONTIN) 600 MG tablet, 1 tablet Daily., Disp: , Rfl:     ketoconazole (NIZORAL) 2 % shampoo, Apply 1 Application topically to the appropriate area as directed 1 (One) Time Per Week., Disp: , Rfl:     lidocaine-prilocaine (EMLA) 2.5-2.5 % cream, Apply 1 Application topically to the appropriate area as directed., Disp: , Rfl:     meclizine (ANTIVERT) 25 MG tablet, , Disp: , Rfl:     metoprolol tartrate (LOPRESSOR) 25 MG tablet, Take 1 tablet by mouth 2 (Two) Times a Day., Disp: , Rfl:     omeprazole (priLOSEC) 40 MG capsule, Take 1 capsule by mouth Daily., Disp: , Rfl:     ondansetron ODT (ZOFRAN-ODT) 4 MG disintegrating tablet, Place 1 tablet on the tongue Every 8 (Eight) Hours As Needed., Disp: , Rfl:     PARoxetine (PAXIL) 30 MG tablet, Take 1 tablet by mouth Every Morning., Disp: , Rfl:     promethazine (PHENERGAN) 25 MG tablet, Take 1 tablet by mouth Every 8 (Eight) Hours As Needed., Disp: , Rfl:     QUEtiapine (SEROquel) 100 MG tablet, Take 1 tablet by mouth Every Night., Disp: , Rfl:     Retin-A 0.05 % cream, , Disp: , Rfl:     SUMAtriptan (IMITREX) 100 MG tablet, 1 tablet 1 (One) Time As Needed., Disp: , Rfl:     tiZANidine (ZANAFLEX) 4 MG tablet, Take 1.5 tablets by mouth Every 8 (Eight) Hours As Needed for Muscle Spasms., Disp: , Rfl:     Ventolin  (90 Base) MCG/ACT inhaler, Inhale 2 puffs Every 6 (Six) Hours As Needed., Disp: , Rfl:     vitamin D (ERGOCALCIFEROL) 1.25 MG (43415 UT) capsule capsule, Take 1 capsule by mouth Every 7 (Seven) Days., Disp: , Rfl:     Vraylar 1.5 MG capsule capsule, Take 1 capsule by mouth Daily., Disp: , Rfl:     ALLERGIES:  "   Allergies   Allergen Reactions    Codeine Dizziness and Headache         REVIEW OF SYSTEMS:    A comprehensive 14 point review of systems was performed.  Significant findings as mentioned above.  All other systems reviewed and are negative.        Physical Exam   Vital Signs: /81   Pulse 91   Temp 97.3 °F (36.3 °C)   Resp 16   Ht 167.6 cm (65.98\")   Wt 100 kg (220 lb 6.4 oz)   SpO2 98%   BMI 35.59 kg/m²     ECOG score: 0   General: Well developed, well nourished, alert and oriented x 3, in no acute distress.   Head: ATNC   Eyes: PERRL, No evidence of conjunctivitis.   Nose: No nasal discharge.   Mouth: Oral mucosal membranes moist. No oral ulceration or hemorrhages.   Neck: Neck supple. No thyromegaly. No JVD.   Lungs: Clear in all fields to A&P without rales, rhonchi or wheezing.   Heart: S1, S2. Regular rate and rhythm. No murmurs, rubs, or gallops.   Abdomen: Soft. Bowel sounds are normoactive. Nontender with palpation. No Hepatosplenomegaly can be appreciated.   Extremities: No cyanosis or edema. Peripheral pulses palpable and equal bilaterally.   Integumentary: No rash, sores, erythema or nodules. No blistering, bruising, or dry skin.   Hem/Lymph Nodes: No palpable cervical, submandibular, supraclavicular, axillary  lymphadenopathy noted. No petechiae, purpura or ecchymosis noted.   Neurologic: Grossly non-focal exam    Pain Score:  Pain Score    07/23/25 1303   PainSc: 5        PHQ-Score Total:  PHQ-9 Total Score:         PATHOLOGY:        ENDOSCOPY:        IMAGING:  US Abdomen Complete (10/01/2024 09:04)   FINDINGS:  Liver:  Unremarkable as visualized.  No mass.  No intrahepatic bile  duct dilation.  Gallbladder:  Cholecystectomy.  Common bile duct:  Unremarkable as visualized.  No stones.  No  dilation.  Common bile duct measures 0.43 cm in diameter.  Pancreas:  Unremarkable as visualized.  Kidneys:  Unremarkable as visualized.  No stones.  No hydronephrosis.   The right kidney measures 9.9 " cm in length.  The left kidney measures  11.8 cm in length.  Spleen:  Unremarkable as visualized.  The spleen measures 11 cm in  maximum dimension.  Aorta:  Unremarkable as visualized.  No aneurysm.  Inferior vena cava:  Unremarkable as visualized.     IMPRESSION:  No acute findings in the abdomen.    RECENT LABS:  Lab Results   Component Value Date    WBC 4.3 06/24/2025    HGB 13.3 06/24/2025    HCT 40.3 06/24/2025     (H) 06/24/2025    RDW 12.1 06/24/2025     06/24/2025    NEUTRORELPCT 55 06/24/2025    LYMPHORELPCT 34 06/24/2025    MONORELPCT 8 06/24/2025    EOSRELPCT 2 06/24/2025    BASORELPCT 1 06/24/2025    NEUTROABS 2.3 06/24/2025    LYMPHSABS 1.5 06/24/2025       Lab Results   Component Value Date     06/24/2025    K 4.1 06/24/2025    CO2 20 06/24/2025     06/24/2025    BUN 15 06/24/2025    CREATININE 1.11 (H) 06/24/2025    GLUCOSE 100 (H) 06/24/2025    CALCIUM 9.4 06/24/2025    ALKPHOS 65 06/24/2025    AST 22 06/24/2025    ALT 19 06/24/2025    BILITOT 0.3 06/24/2025    ALBUMIN 4.4 06/24/2025    PROTEINTOT 6.9 06/24/2025 11/19/2015  WBC  4.5 - 12.5 K/Cumm 3.9 Low    RBC  4.20 - 5.40 Million 3.78 Low    Hemoglobin  12.0 - 16.0 g/dL 12.8   Hematocrit  37.0 - 47.0 % 37.2   MCV  80.0 - 94.0 fL 98.4 High    MCH  27.0 - 33.0 pg 33.9 High    MCHC  33.0 - 37.0 g/dL 34.4   Platelets  130 - 400 K/Cumm 184   RDW  11.5 - 14.5 % 11.6   MPV  6.0 - 10.0 fL 9.8   Neutrophil Rel %  30.0 - 70.0 % 44.4   Lymphocyte Rel %  21.0 - 51.0 % 40.6   Monocyte Rel %  0.0 - 10.0 % 11.6 High    Eosinophil Rel %  0.0 - 5.0 % 2.6   Basophil Rel %  0.0 - 2.0 % 0.8   Immature Granulocyte Rel %  0.00 - 0.43 % 0.00   Neutrophils Absolute  1.4 - 6.5 K/Cumm 1.7   Lymphocytes Absolute  1.0 - 3.0 K/Cumm 1.6   Monocytes Absolute  0.1 - 0.9 K/Cumm 0.5   Eosinophils Absolute  0.0 - 0.7 K/Cumm 0.1   Basophils Absolute  0.0 - 0.3 K/Cumm 0.0   Abs Imm Gran  0.000 - 0.031 K/Cumm 0.000             Work Up  08/21/2024            Vitamin B-12  211 - 946 pg/mL 513     Folate  4.78 - 24.20 ng/mL 6.62     Lab Results   Component Value Date    CRP <0.30 08/21/2024     Lab Results   Component Value Date    SEDRATE 2 06/24/2025    SEDRATE 2 08/21/2024     Hepatitis B Surface Ag  Non-Reactive Non-Reactive   Hep A IgM  Non-Reactive Non-Reactive   Hep B C IgM  Non-Reactive Non-Reactive   Hepatitis C Ab  Non-Reactive Non-Reactive     HIV-1/ HIV-2  Non-Reactive Non-Reactive     NORMA Direct  Negative Positive Abnormal      Rheumatoid Factor Quantitative  0.0 - 14.0 IU/mL <10.0     BCR/ABL: Negative      ASSESSMENT & PLAN:  Ludy Lerma is a very pleasant 45 y.o. female with    1. Leukopenia  2. Macrocytosis without anemia  3. Folate deficiency  - Previous available CBCs were reviewed and patient was noted to have WBC 3.4 on 04/19/2024 and WBC 3.0 on 07/23/2024, macrocytosis with normal Hg/Hct and platelets. There is a CBC available from November 2015 that showed a low WBC 3.9. Unfortunately, there are no other CBCs available to review for comparison of trend.   - She continues to struggle with fatigue but is no longer having drenching night sweats.   - CBC from initial consultation showed low WBC 3.29 with normal ANC. Hg/Hct and platelets were normal. PBS showed red blood cell macrocytosis with otherwise normal granulocyte and lymphocyte morphology, no circulating blasts identified, adequate platelets. B12 was replete. Folate was marginally low. CRP and ESR were normal. Acute Hepatitis panel and HIV panel non-reactive. NORMA was positive. RF was negative. Flow Cytometry showed granulocytes with non specific partial CD56 expression, no other immunophenotypic abnormalities. BCR/ABL was negative.   - Abdominal ultrasound was unremarkable.   - She was evaluated by rheumatology in June 2025 and work up was unremarkable for underlying autoimmune disorder. She was diagnosed with fibromyalgia.   - Referral was placed for rheumatology and she  has an appointment scheduled for June 2025.  - Advised to continue Folic Acid daily.  - Repeat CBC from today shows ongoing but improved leukopenia (3.37). Repeat Flow Cytometry is pending from today.  - Will follow up in 3 months with repeat CBC and Folate.  - Discussed more aggressive management with possible bone marrow biopsy and she would like to continue conservative management for now.     4. Shortness of Breath  - Ongoing for several months but denies any worsening. She does report that it is worse with exertion. No cough, pleuritic pain. No fever/chills. Previous smoker, quit 3-4 years ago.  - Chest x-ray was ordered per PCP but no results available to me. Most recent chest x-ray per rheumatology was unremarkable.  - PCP also ordered low dose CT Chest which she was a no show. Recommended she follow up with PCP to reschedule.     5. Chronic Pain Syndrome  6. Fibromyalgia  - She reports generalized pain all over. She has been taking Goody Powders for pain relief(recently used 2 boxes- 60 packs each) during the month of June. Advised patient that she needs to cut back on this medication. Recommended alternate methods of pain relief including heat/ice. Will place referral to pain management to further evaluate.        [facility] / MANJINDER/Other  Quality measures  -  Ludy Lrema did not receive 2024 flu vaccine.  This was recommended.  -  Ludy Lerma reports a pain score of 5.  Given her pain assessment as noted, treatment options were discussed and the following options were decided upon as a follow-up plan to address the patient's pain: continuation of current treatment plan for pain and referral to specialist for assistance in pain treatment guidance.  -  Current outpatient and discharge medications have been reconciled for the patient.  Reviewed by: CARLOS Nevarez                I spent 30 minutes caring for Ludy on this date of service. This time includes time spent by me in the following activities:  preparing for the visit, reviewing tests, performing a medically appropriate examination and/or evaluation, counseling and educating the patient/family/caregiver, referring and communicating with other health care professionals, documenting information in the medical record, independently interpreting results and communicating that information with the patient/family/caregiver, care coordination, ordering medications, ordering test(s), obtaining a separately obtained history, and reviewing a separately obtained history.                                   Electronically Signed by: CARLOS Valencia , July 23, 2025 13:24 EDT           Electronically Signed by: CARLOS Valencia , July 23, 2025 13:24 EDT       CC:   No ref. provider found  Dorie Patton APRN

## 2025-07-24 LAB — REF LAB TEST METHOD: NORMAL
